# Patient Record
Sex: MALE | Employment: UNEMPLOYED | URBAN - NONMETROPOLITAN AREA
[De-identification: names, ages, dates, MRNs, and addresses within clinical notes are randomized per-mention and may not be internally consistent; named-entity substitution may affect disease eponyms.]

---

## 2018-10-10 ENCOUNTER — TELEMEDICINE2 (OUTPATIENT)
Dept: MEDICAL GROUP | Facility: PHYSICIAN GROUP | Age: 43
End: 2018-10-10

## 2018-10-10 VITALS
SYSTOLIC BLOOD PRESSURE: 166 MMHG | WEIGHT: 133 LBS | DIASTOLIC BLOOD PRESSURE: 94 MMHG | HEIGHT: 67 IN | HEART RATE: 61 BPM | OXYGEN SATURATION: 98 % | BODY MASS INDEX: 20.88 KG/M2

## 2018-10-10 DIAGNOSIS — B20 HIV (HUMAN IMMUNODEFICIENCY VIRUS INFECTION) (HCC): ICD-10-CM

## 2018-10-10 DIAGNOSIS — Z90.5 SINGLE KIDNEY: ICD-10-CM

## 2018-10-10 DIAGNOSIS — Z87.442 HISTORY OF KIDNEY STONES: ICD-10-CM

## 2018-10-10 DIAGNOSIS — B35.4 TINEA CORPORIS: ICD-10-CM

## 2018-10-10 DIAGNOSIS — K42.9 UMBILICAL HERNIA WITHOUT OBSTRUCTION AND WITHOUT GANGRENE: ICD-10-CM

## 2018-10-10 DIAGNOSIS — F33.42 RECURRENT MAJOR DEPRESSIVE DISORDER, IN FULL REMISSION (HCC): ICD-10-CM

## 2018-10-10 PROBLEM — Z21 HIV (HUMAN IMMUNODEFICIENCY VIRUS INFECTION) (HCC): Status: ACTIVE | Noted: 2018-10-10

## 2018-10-10 PROCEDURE — 99203 OFFICE O/P NEW LOW 30 MIN: CPT | Performed by: NURSE PRACTITIONER

## 2018-10-10 RX ORDER — AZITHROMYCIN 600 MG/1
600 TABLET, FILM COATED ORAL DAILY
COMMUNITY
End: 2019-07-01

## 2018-10-10 RX ORDER — TRAZODONE HYDROCHLORIDE 100 MG/1
100 TABLET ORAL NIGHTLY
COMMUNITY
End: 2024-02-09

## 2018-10-10 RX ORDER — SULFAMETHOXAZOLE AND TRIMETHOPRIM 800; 160 MG/1; MG/1
1 TABLET ORAL 2 TIMES DAILY
COMMUNITY
End: 2019-10-21 | Stop reason: SDUPTHER

## 2018-10-10 NOTE — ASSESSMENT & PLAN NOTE
This is a 43 year old male who is new to [facility]. Former [facility] patient. Patient states lowest CD4 was 35. Hospitalized for PCP in the past. Current regiment patient likes.   HIV ROS     Medication:   Current Outpatient Prescriptions:   •  dolutegravir, 50 mg, Oral, DAILY  •  sulfamethoxazole-trimethoprim, 1 Tab, Oral, BID  •  azithromycin, 600 mg, Oral, DAILY  •  traZODone, 100 mg, Oral, Nightly  •  sertraline, 50 mg, Oral, DAILY  •  Emtricitabine-Tenofovir AF, Take  by mouth., Taking    Missed medications: no .    CD4 - 135   Viral Load: 66,600  Kidney Function normal     Weight loss? yes    Night sweats?no .   Body aches ?  no .    Skin ? Red, circular, itchy lesions on foot, back, trunk and scalp   Neuro: No headache, No sensation changes, No dizziness, No confusion.  Cardiac: No cp, No Wade, No peripheral edema. No calf pain at rest.  Pulm: No cough, No sob, No sputum   Gastro: No nausea, No vomiting,No diarrhea, No rectal bleeding, No abdominal pain  : No dysuria. No blisters, No incontinence.   Constitutional : No fevers, No night sweats.  Musculoskeletal: No swelling,redness or pain to joints. Normal ROM and gait  Skin: rashes, lesions, itching  PHQ 2 negative Depression Screening    Little interest or pleasure in doing things?      Feeling down, depressed , or hopeless?     Trouble falling or staying asleep, or sleeping too much?      Feeling tired or having little energy?      Poor appetite or overeating?      Feeling bad about yourself - or that you are a failure or have let yourself or your family down?     Trouble concentrating on things, such as reading the newspaper or watching television?     Moving or speaking so slowly that other people could have noticed.  Or the opposite - being so fidgety or restless that you have been moving around a lot more than usual?      Thoughts that you would be better off dead, or of hurting yourself?      Patient Health Questionnaire Score:         If depressive  symptoms identified deferred to follow up visit unless specifically addressed in assesment and plan.    Interpretation of PHQ-9 Total Score   Score Severity   1-4 No Depression   5-9 Mild Depression   10-14 Moderate Depression   15-19 Moderately Severe Depression   20-27 Severe Depression      Psych: hallucinations ? no .     Auditory hallucinations ? no .   Paranoid  ? no .    DRUG ---DRUG interaction? no .

## 2018-10-10 NOTE — PROGRESS NOTES
Chief Complaint   Patient presents with   • HIV Positive/AIDS           HISTORY OF PRESENT ILLNESS: Patient is a 43 y.o. male NEW patient, who presents today to discuss:     Verified Identification with patient.   RN presenter @  [facility]    HIV (human immunodeficiency virus infection) ([facility])  This is a 43 year old male who is new to [facility]. Former [facility] patient. Patient states lowest CD4 was 35. Hospitalized for PCP in the past. Current regiment patient likes.   HIV ROS     Medication:   Current Outpatient Prescriptions:   •  dolutegravir, 50 mg, Oral, DAILY  •  sulfamethoxazole-trimethoprim, 1 Tab, Oral, BID  •  azithromycin, 600 mg, Oral, DAILY  •  traZODone, 100 mg, Oral, Nightly  •  sertraline, 50 mg, Oral, DAILY  •  Emtricitabine-Tenofovir AF, Take  by mouth., Taking    Missed medications: no .    CD4 - 135   Viral Load: 66,600  Kidney Function normal     Weight loss? yes    Night sweats?no .   Body aches ?  no .    Skin ? Red, circular, itchy lesions on foot, back, trunk and scalp   Neuro: No headache, No sensation changes, No dizziness, No confusion.  Cardiac: No cp, No Wade, No peripheral edema. No calf pain at rest.  Pulm: No cough, No sob, No sputum   Gastro: No nausea, No vomiting,No diarrhea, No rectal bleeding, No abdominal pain  : No dysuria. No blisters, No incontinence.   Constitutional : No fevers, No night sweats.  Musculoskeletal: No swelling,redness or pain to joints. Normal ROM and gait  Skin: rashes, lesions, itching  PHQ 2 negative Depression Screening    Little interest or pleasure in doing things?      Feeling down, depressed , or hopeless?     Trouble falling or staying asleep, or sleeping too much?      Feeling tired or having little energy?      Poor appetite or overeating?      Feeling bad about yourself - or that you are a failure or have let yourself or your family down?     Trouble concentrating on things, such as reading the newspaper or watching television?     Moving or speaking  "so slowly that other people could have noticed.  Or the opposite - being so fidgety or restless that you have been moving around a lot more than usual?      Thoughts that you would be better off dead, or of hurting yourself?      Patient Health Questionnaire Score:         If depressive symptoms identified deferred to follow up visit unless specifically addressed in assesment and plan.    Interpretation of PHQ-9 Total Score   Score Severity   1-4 No Depression   5-9 Mild Depression   10-14 Moderate Depression   15-19 Moderately Severe Depression   20-27 Severe Depression      Psych: hallucinations ? no .     Auditory hallucinations ? no .   Paranoid  ? no .    DRUG ---DRUG interaction? no .      Tinea corporis  Patient has tried OTC TINACTIN. No resolution.     History of kidney stones  Two years ago was last episode. Encourage hydration.     Single kidney  Discussed need to protect kidney.    Recurrent major depressive disorder, in full remission (HCC)  Patient is currently being treated with medication for an emotional disorder. Taking meds every day and denies worsening of sx, SI or HI. Advised on non-pharmaceutical means of controlling and dealing with emotions.    Patient is actively seeing mental health provider. [facility]          Allergies   Allergen Reactions   • Videx                ROS: As documented in my HPI      Exam:  Blood pressure (!) 166/94, pulse 61, height 1.702 m (5' 7\"), weight 60.3 kg (133 lb), SpO2 98 %.  General:  Well nourished, well developed male in NAD  Head: No lesions, Non tender scalp  Neck: Supple. Thyroid is symmetric. No lymphadenopathy   Oral Cavity: no thrush or gum lesions  ABD: hernia umbilical - no incarceration  Pulmonary: .  Normal effort. No rales, ronchi, or wheezing via Telesteth system  Cardiovascular: Regular rate and rhythm without murmur.   Extremities: no clubbing, cyanosis, or edema.  Psych: Alert and oriented x3. Normal mood and affect.  Neurological: No focal " deficits  SKIN scattered red , circular, no pustular itchy lesion on feet, back, trunk Please note that this dictation was created using voice recognition software. I have made every reasonable attempt to correct obvious errors, but I expect that there are errors of grammar and possibly content that I did not discover before finalizing the note.    Assessment/Plan:  1. HIV (human immunodeficiency virus infection) ([facility])   patient CD4 count is 135 this is the first time that it has been over 50 in quite some time.  Patient will continue on Bactrim and erythromycin for prophylaxis for PCP and MAC.  Viral load is elevated will need to continue to monitor this as he has been disrupted in his treatment due to incarceration.  There is also the consideration that he has resistance.  Liver enzymes are slightly elevated as well AST is 134 ALT 49 hepatitis screen is negative RPR is negative I do not have results of chest x-ray.   2. Recurrent major depressive disorder, in full remission ([facility])   Current status of condition is chronic and controlled on therapy.     3. Single kidney   monitor   4. History of kidney stones     5. Tinea corporis  Clortrimazole from [facility], as TINACTIN has failed   6. Umbilical hernia without obstruction and without gangrene   stable      BMI 20 - ADD BOOST bid.

## 2018-10-10 NOTE — ASSESSMENT & PLAN NOTE
Patient is currently being treated with medication for an emotional disorder. Taking meds every day and denies worsening of sx, SI or HI. Advised on non-pharmaceutical means of controlling and dealing with emotions.    Patient is actively seeing mental health provider. [facility]

## 2019-02-20 ENCOUNTER — TELEMEDICINE2 (OUTPATIENT)
Dept: INFECTIOUS DISEASES | Facility: MEDICAL CENTER | Age: 44
End: 2019-02-20
Payer: OTHER GOVERNMENT

## 2019-02-20 VITALS
DIASTOLIC BLOOD PRESSURE: 89 MMHG | BODY MASS INDEX: 20.28 KG/M2 | HEIGHT: 67 IN | RESPIRATION RATE: 18 BRPM | SYSTOLIC BLOOD PRESSURE: 134 MMHG | WEIGHT: 129.2 LBS | HEART RATE: 79 BPM | OXYGEN SATURATION: 96 % | TEMPERATURE: 98.2 F

## 2019-02-20 DIAGNOSIS — F33.42 RECURRENT MAJOR DEPRESSIVE DISORDER, IN FULL REMISSION (HCC): ICD-10-CM

## 2019-02-20 DIAGNOSIS — B20 HIV (HUMAN IMMUNODEFICIENCY VIRUS INFECTION) (HCC): ICD-10-CM

## 2019-02-20 PROBLEM — B35.4 TINEA CORPORIS: Status: RESOLVED | Noted: 2018-10-10 | Resolved: 2019-02-20

## 2019-02-20 PROCEDURE — 99203 OFFICE O/P NEW LOW 30 MIN: CPT | Performed by: INTERNAL MEDICINE

## 2019-02-20 NOTE — PROGRESS NOTES
"[facility]     Date of Service: 2/20/2019    Referring Physician: RAZ    Reason for Referral: HIV    Chief Complaint: Here to establish care for HIV    History of Present Illness:     Derrick Gaytan is a 43 y.o. male with known HIV and depression.  Patient has been taking his current ART, reports not missing doses.  Tolerating his medications, no new issues to report.    Current ART: Descovy + Tivicay  Prior HIV treatment: Multiple, including Truvada, indinavir, didanosine  Resistance: Unknown  Diagnosed with HIV: January 2000  Risk factors: MSM, \"Living on the street\"  HIV CD4 / viral load at start of treatment: Unknown  OIs: \"Double pneumonia\"    Pertinent Lab Results:  Date  CD4/%  HIV VL  ART  6/2018    [id number] Descovy+Tivicay  8/2018  135/10.4% 58158  \"  12/2018 143/7.5% 80  \"  1/28/2019 148/8.2% 140  \"    Screening:   HBV serologies:?  HAV serology:?  HCV Ab:negative  RPR: negative  Quant gold/PPD:?  Urine GC/CT:?  UA:?    LABS  Date  WBC  HGB  PLAT  1/28/2019 3.3  15.9  177    Date  CR   1/28/2019 1.38    Date  PT/INR  TBili AlkPh  AST ALT Album  1/28/2019     0.3   22 13    Lipids   Date  Chol Trig HDL VLDL LDL  ?    HgbA1C  Date  HbA1c  ?    Review of Systems:  All other systems reviewed and are negative expect as noted in HPI    Past Medical History:   Diagnosis Date   • AIDS (acquired immune deficiency syndrome) (HCC)    • Depression    • HIV (human immunodeficiency virus infection)    • kidney problems     have only one kidney    • Single kidney        No past surgical history on file.    Family History   Problem Relation Age of Onset   • Diabetes Mother    • Diabetes Father    • No Known Problems Sister        Social History     Social History   • Marital status: Single     Spouse name: N/A   • Number of children: N/A   • Years of education: N/A     Occupational History   • Not on file.     Social History Main Topics   • Smoking status: Former Smoker   • Smokeless " "tobacco: Never Used      Comment: 1 ppd   • Alcohol use No   • Drug use: Yes     Types: Methamphetamines      Comment: pot   • Sexual activity: Not Currently     Partners: Male     Other Topics Concern   • Not on file     Social History Narrative   • No narrative on file       Allergies   Allergen Reactions   • Didanosine      \"Nearly killed me\". Breathing difficulties       Medications:  Current Outpatient Prescriptions on File Prior to Visit   Medication Sig Dispense Refill   • sulfamethoxazole-trimethoprim (BACTRIM DS) 800-160 MG tablet Take 1 Tab by mouth 2 times a day.     • azithromycin (ZITHROMAX) 600 MG Tab Take 600 mg by mouth every day.     • traZODone (DESYREL) 100 MG Tab Take 100 mg by mouth every evening.     • sertraline (ZOLOFT) 50 MG Tab Take 50 mg by mouth every day.     • dolutegravir (TIVICAY) 50 MG Tab tablet Take 1 Tab by mouth every day. 30 Tab 11   • Emtricitabine-Tenofovir AF (DESCOVY) 200-25 MG Tab Take 1 Tab by mouth every day. 30 Tab 11     No current facility-administered medications on file prior to visit.        Physical Exam:   This consultation was conducted utilizing secure and encrypted videoconferencing equipment with the assistance of a trained tele-presenter at the originating site.     Vital Signs: /89   Pulse 79   Temp 36.8 °C (98.2 °F)   Resp 18   Ht 1.702 m (5' 7\")   Wt 58.6 kg (129 lb 3.2 oz)   SpO2 96%   BMI 20.24 kg/m²   Vital signs reviewed  Constitutional: Patient is oriented to person, place, and time. Appears well-developed and well-nourished. No distress  Head: Atraumatic, normocephalic  Eyes: Conjunctivae normal, EOM intact. Pupils are equal, round, and reactive to light.   Mouth/Throat: Lips without lesions, good dentition, oropharynx is clear and moist.  Neck: Neck supple. No masses/lymphadenopathy  Cardiovascular: Normal rate, regular rhythm, normal S1S2 and intact distal pulses. No murmur, gallop, or friction rub. No pedal edema.  Pulmonary/Chest: No " respiratory distress. Unlabored respiratory effort, lungs clear to auscultation. No wheezes or rales.   Abdominal: Soft, non tender. BS + x 4. No masses or hepatosplenomegaly.   Musculoskeletal: No joint tenderness, swelling, erythema, or restriction of motion noted.  Neurological: Alert and oriented to person, place, and time. No gross cranial nerve deficit. No focal neural deficit noted  Skin: Skin is warm and dry. No rashes or embolic phenomena noted on exposed skin  Psychiatric: Normal mood and affect. Behavior is normal.     LABS:  WBC   Date/Time Value Ref Range Status   05/08/2011 10:40 PM 9.1 4.8 - 10.8 K/uL Final     RBC   Date/Time Value Ref Range Status   05/08/2011 10:40 PM 5.06 4.70 - 6.10 M/uL Final     Hemoglobin   Date/Time Value Ref Range Status   05/08/2011 10:40 PM 16.0 14.0 - 18.0 g/dL Final     Hematocrit   Date/Time Value Ref Range Status   05/08/2011 10:40 PM 44.8 42.0 - 52.0 % Final     MCV   Date/Time Value Ref Range Status   05/08/2011 10:40 PM 88.5 79.0 - 98.0 fL Final     MCH   Date/Time Value Ref Range Status   05/08/2011 10:40 PM 31.6 27.0 - 33.0 pg Final     MCHC   Date/Time Value Ref Range Status   05/08/2011 10:40 PM 35.7 (H) 30.0 - 35.0 g/dL Final     MPV   Date/Time Value Ref Range Status   05/08/2011 10:40 PM 6.9 6.7 - 10.4 fL Final     Sodium   Date/Time Value Ref Range Status   05/08/2011 10:40  (L) 135 - 145 mmol/L Final     Potassium   Date/Time Value Ref Range Status   05/08/2011 10:40 PM 4.0 3.6 - 5.5 mmol/L Final     Chloride   Date/Time Value Ref Range Status   05/08/2011 10:40  96 - 112 mmol/L Final     Co2   Date/Time Value Ref Range Status   05/08/2011 10:40 PM 22 20 - 33 mmol/L Final     Glucose   Date/Time Value Ref Range Status   05/08/2011 10:40 PM 86 65 - 99 mg/dL Final     Bun   Date/Time Value Ref Range Status   05/08/2011 10:40 PM 20 8 - 22 mg/dL Final     Creatinine   Date/Time Value Ref Range Status   05/08/2011 10:40 PM 1.18 0.50 - 1.40 mg/dL  Final   01/23/2009 01:10 PM 1.3 0.5 - 1.4 mg/dL Final     Alkaline Phosphatase   Date/Time Value Ref Range Status   05/08/2011 10:40 PM 92 30 - 99 U/L Final     AST(SGOT)   Date/Time Value Ref Range Status   05/08/2011 10:40 PM 34 12 - 45 U/L Final     ALT(SGPT)   Date/Time Value Ref Range Status   05/08/2011 10:40 PM 23 2 - 50 U/L Final     Total Bilirubin   Date/Time Value Ref Range Status   05/08/2011 10:40 PM 1.4 0.1 - 1.5 mg/dL Final      No results found for: CPKTOTAL     MICRO:  Blood Culture   Date Value Ref Range Status   01/23/2009   Final    Blood culture testing and Gram stain, if indicated, are  performed at [facility], [address] [place] [address].  Positive blood cultures are  sent to [facility], [address] [place] [address], for organism identification and  susceptibility testing.  No growth after 5 days of incubation.         Latest pertinent labs were reviewed    Assessment:   Derrick Gaytan is a 43 y.o. male with a history of HIV and depression.  Has current low level viremia and low CD4 count.  He was previously under the care of [facility].    Pertinent Diagnoses:  HIV  Depression    Plan:   Medications  -Continue Descovy 1 tab daily + Tivicay 1 tab daily  -Continue Bactrim prophylaxis 1DS tab daily  -Discontinue azithromycin since he has no indication for MAC prophylaxis  Labs  -Please check hepatitis B surface antigen, hepatitis B surface antibody, hepatitis B core antibody  -Please check hepatitis C antibody  -Please check urine GC/CT NAAT  -Please check serum Treponema IgG or serum RPR  -Please check hemoglobin A1c if not checked within the past year  -Please check TB QuantiFERON gold or PPD skin test if not done within the past year  -Check urinalysis to look for proteinuria  -Check lipid panel if not obtained within the past year  Vaccines  -If not received pneumococcal vaccination, recommend PCV 13, followed by PPSV 23 greater  than or equal to 8 weeks later  Records  -Please obtain records from [facility], especially prior ART regimens and HIV genotypic resistance testing if available    Follow-up in 3 months with repeat CD4 count and viral load    Dav Echols M.D.    Please note that this dictation was created using voice recognition software. I have worked with technical experts from [facility] to optimize the interface.  I have made every reasonable attempt to correct obvious errors, but there may be errors of grammar and possibly content that I did not discover before finalizing the note.

## 2019-06-30 NOTE — PROGRESS NOTES
"[place] - [facility] HIV TELECONFERENCE CLINIC NOTE -follow-up     Date of Service: 6/29/2019    Referring Physician: RAZ    Chief Complaint: Follow-up for HIV    History of Present Illness:     Derrick Gaytan is a 44 y.o. male with known HIV and depression.  Patient has been taking his current ART, reports not missing doses.  Tolerating his medications, no new issues to report.     Current ART: Descovy + Tivicay  Prior HIV treatment: Multiple, including Truvada, indinavir, didanosine  Resistance: Unknown  Diagnosed with HIV: January 2000  Risk factors: MSM, \"Living on the street\"  HIV CD4 / viral load at start of treatment: Unknown  OIs: \"Double pneumonia\"     Pertinent Lab Results:  Date                 CD4/%             HIV VL             ART  6/2018                                     [id number]          Descovy+Tivicay  8/2018             135/10.4%       [id number]              \"  12/2018           143/7.5%         80                    \"  1/28/2019        148/8.2%         140                  \"  6/15/2019 239/10.4% 50  \"     Screening:   HBV serologies:?  HAV serology:?  HCV Ab:negative  RPR: negative  Quant gold/PPD:?  Urine GC/CT:?  UA:?     LABS  Date                 WBC                HGB                PLAT  1/28/2019        3.3                   15.9                 177  6/15/2019 4.4  15.4  197      Date                 CR         1/28/2019        1.38  6/15/2019 1.48, EGFR 56     Date                 PT/INR  TBili   AlkPh               AST     ALT      Album  1/28/2019                      0.3                             22        13  6/15/2019  0.4   29 32      Lipids   Date                 Chol     Trig      HDL     VLDL   LDL  ?     HgbA1C  Date                 HbA1c  ?      Review of Systems:  All other systems reviewed and are negative expect as noted in HPI    Past Medical History:   Diagnosis Date   • AIDS (acquired immune deficiency syndrome) (HCC)    • Depression    • HIV (human immunodeficiency virus " "infection)    • kidney problems     have only one kidney    • Single kidney        No past surgical history on file.    Family History   Problem Relation Age of Onset   • Diabetes Mother    • Diabetes Father    • No Known Problems Sister        Social History     Social History   • Marital status: Single     Spouse name: N/A   • Number of children: N/A   • Years of education: N/A     Occupational History   • Not on file.     Social History Main Topics   • Smoking status: Former Smoker   • Smokeless tobacco: Never Used      Comment: 1 ppd   • Alcohol use No   • Drug use: Yes     Types: Methamphetamines      Comment: pot   • Sexual activity: Not Currently     Partners: Male     Other Topics Concern   • Not on file     Social History Narrative   • No narrative on file       Allergies   Allergen Reactions   • Didanosine      \"Nearly killed me\". Breathing difficulties       Medications:  Current Outpatient Prescriptions on File Prior to Visit   Medication Sig Dispense Refill   • sulfamethoxazole-trimethoprim (BACTRIM DS) 800-160 MG tablet Take 1 Tab by mouth 2 times a day.     • azithromycin (ZITHROMAX) 600 MG Tab Take 600 mg by mouth every day.     • traZODone (DESYREL) 100 MG Tab Take 100 mg by mouth every evening.     • sertraline (ZOLOFT) 50 MG Tab Take 50 mg by mouth every day.     • dolutegravir (TIVICAY) 50 MG Tab tablet Take 1 Tab by mouth every day. 30 Tab 11   • Emtricitabine-Tenofovir AF (DESCOVY) 200-25 MG Tab Take 1 Tab by mouth every day. 30 Tab 11     No current facility-administered medications on file prior to visit.    Lisinopril  Lovastatin    Physical Exam:   This consultation was conducted utilizing secure and encrypted videoconferencing equipment with the assistance of a trained tele-presenter at the originating site.     Vital Signs: T 98 HR 70 RR 20 /80 O2 96% weight 136 pounds height 5 feet 7 inches  Vital signs reviewed  Constitutional: Patient is oriented to person, place, and time. " Appears well-developed and well-nourished. No distress  Head: Atraumatic, normocephalic  Eyes: Conjunctivae normal, EOM intact. Pupils are equal, round, and reactive to light.   Mouth/Throat: Lips without lesions, good dentition, oropharynx is clear and moist.  Neck: Neck supple. No masses/lymphadenopathy  Cardiovascular: Normal rate, regular rhythm, normal S1S2 and intact distal pulses. No murmur, gallop, or friction rub. No pedal edema.  Pulmonary/Chest: No respiratory distress. Unlabored respiratory effort, lungs clear to auscultation. No wheezes or rales.   Abdominal: Soft, non tender. BS + x 4. No masses or hepatosplenomegaly.   Musculoskeletal: No joint tenderness, swelling, erythema, or restriction of motion noted.  Neurological: Alert and oriented to person, place, and time. No gross cranial nerve deficit.   Skin: Skin is warm and dry. No rashes or embolic phenomena noted on exposed skin  Psychiatric: Normal mood and affect. Behavior is normal.     LABS:  WBC   Date/Time Value Ref Range Status   05/08/2011 10:40 PM 9.1 4.8 - 10.8 K/uL Final     RBC   Date/Time Value Ref Range Status   05/08/2011 10:40 PM 5.06 4.70 - 6.10 M/uL Final     Hemoglobin   Date/Time Value Ref Range Status   05/08/2011 10:40 PM 16.0 14.0 - 18.0 g/dL Final     Hematocrit   Date/Time Value Ref Range Status   05/08/2011 10:40 PM 44.8 42.0 - 52.0 % Final     MCV   Date/Time Value Ref Range Status   05/08/2011 10:40 PM 88.5 79.0 - 98.0 fL Final     MCH   Date/Time Value Ref Range Status   05/08/2011 10:40 PM 31.6 27.0 - 33.0 pg Final     MCHC   Date/Time Value Ref Range Status   05/08/2011 10:40 PM 35.7 (H) 30.0 - 35.0 g/dL Final     MPV   Date/Time Value Ref Range Status   05/08/2011 10:40 PM 6.9 6.7 - 10.4 fL Final     Sodium   Date/Time Value Ref Range Status   05/08/2011 10:40  (L) 135 - 145 mmol/L Final     Potassium   Date/Time Value Ref Range Status   05/08/2011 10:40 PM 4.0 3.6 - 5.5 mmol/L Final     Chloride   Date/Time  Value Ref Range Status   05/08/2011 10:40  96 - 112 mmol/L Final     Co2   Date/Time Value Ref Range Status   05/08/2011 10:40 PM 22 20 - 33 mmol/L Final     Glucose   Date/Time Value Ref Range Status   05/08/2011 10:40 PM 86 65 - 99 mg/dL Final     Bun   Date/Time Value Ref Range Status   05/08/2011 10:40 PM 20 8 - 22 mg/dL Final     Creatinine   Date/Time Value Ref Range Status   05/08/2011 10:40 PM 1.18 0.50 - 1.40 mg/dL Final   01/23/2009 01:10 PM 1.3 0.5 - 1.4 mg/dL Final     Alkaline Phosphatase   Date/Time Value Ref Range Status   05/08/2011 10:40 PM 92 30 - 99 U/L Final     AST(SGOT)   Date/Time Value Ref Range Status   05/08/2011 10:40 PM 34 12 - 45 U/L Final     ALT(SGPT)   Date/Time Value Ref Range Status   05/08/2011 10:40 PM 23 2 - 50 U/L Final     Total Bilirubin   Date/Time Value Ref Range Status   05/08/2011 10:40 PM 1.4 0.1 - 1.5 mg/dL Final      No results found for: CPKTOTAL     MICRO:  Blood Culture   Date Value Ref Range Status   01/23/2009   Final    Blood culture testing and Gram stain, if indicated, are  performed at [facility], [address] [place] [address].  Positive blood cultures are  sent to [facility], [address] [place] [address], for organism identification and  susceptibility testing.  No growth after 5 days of incubation.         Latest pertinent labs were reviewed      Assessment:   Derrick Gaytan is a 43 y.o. male with a history of HIV and depression.  Has current low level viremia and low CD4 count.  He was previously under the care of [facility].     Pertinent Diagnoses:  HIV  Depression    Plan:   Medications  -Continue Descovy 1 tab daily + Tivicay 1 tab daily  -Continue Bactrim prophylaxis 1DS tab daily    Labs as requested at previous visit (please fax and scan if already performed)  -Please check hepatitis B surface antigen, hepatitis B surface antibody, hepatitis B core antibody  -Please check hepatitis C  antibody  -Please check urine GC/CT NAAT  -Please check RPR  -Please check hemoglobin A1c if not checked within the past year  -Please check TB QuantiFERON gold or PPD skin test if not done within the past year  -Check urinalysis to look for proteinuria  -Check lipid panel if not obtained within the past year     Vaccines  -Received PPSV23 on 5/30/2019.  Recommend PCV 13 a year later    Records  -Please obtain records from [facility], especially prior ART regimens and HIV genotypic resistance testing if available     Follow-up in 3 months with repeat CD4 count and viral load, CBC and CMP     Dav Echols M.D.    Please note that this dictation was created using voice recognition software. I have worked with technical experts from [facility] to optimize the interface.  I have made every reasonable attempt to correct obvious errors, but there may be errors of grammar and possibly content that I did not discover before finalizing the note.

## 2019-07-01 ENCOUNTER — TELEMEDICINE2 (OUTPATIENT)
Dept: VASCULAR LAB | Facility: MEDICAL CENTER | Age: 44
End: 2019-07-01
Attending: INTERNAL MEDICINE
Payer: OTHER GOVERNMENT

## 2019-07-01 DIAGNOSIS — B20 HIV (HUMAN IMMUNODEFICIENCY VIRUS INFECTION) (HCC): ICD-10-CM

## 2019-07-01 PROCEDURE — 99213 OFFICE O/P EST LOW 20 MIN: CPT | Performed by: INTERNAL MEDICINE

## 2019-10-20 NOTE — PROGRESS NOTES
"[facility] HIV TELECONFERENCE CLINIC NOTE     Date of Service: 10/20/2019    Referring Physician: RAZ    Chief Complaint: Follow-up for HIV    History of Present Illness:        Derrick Gaytan is a 44 y.o. male with known HIV and depression.  Patient has been taking his current ART, reports not missing doses.  Tolerating his medications.    Patient is requesting a single pill regimen.    Patient also notes a weight of 131 pounds.  In June, he was 140 pounds, then measured at 136 pounds in July 2019.  He is requesting Ensure.  Patient has a significant smoking history about a pack a day as he was 9 years old.  His grandmother had lung cancer.  He has no cough or hemoptysis.     Current ART: Descovy + Tivicay  Prior HIV treatment: Multiple, including Truvada, indinavir, didanosine  Resistance: Unknown  Diagnosed with HIV: January 2000  Risk factors: MSM, \"Living on the street\"  HIV CD4 / viral load at start of treatment: Unknown  OIs: \"Double pneumonia\"     Pertinent Lab Results:  Date                 CD4/%             HIV VL             ART  6/2018                                     [id number]          Descovy+Tivicay  8/2018             135/10.4%       [id number]              \"  12/2018           143/7.5%         80                    \"  1/28/2019        148/8.2%         140                  \"  6/15/2019        239/10.4%       50                    \"  10/12/2019 218/10.9% 40  \"     Screening:   HBV serologies: Nonimmune  HAV serology: Nonimmune  HCV Ab:negative 7/2019  RPR: negative 7/2019  PPD: -3/2019  Urine GC/CT: -9/2019  UA: No proteinuria 9/2019     LABS  Date                 WBC                HGB                PLAT  1/28/2019        3.3                   15.9                 177  6/15/2019        4.4                   15.4                 197        10/12/2019 3.5  16.4  191     Date                 CR         1/28/2019        1.38  6/15/2019        1.48, EGFR 56  10/12/2019 1.36, EGFR " 62     Date                 PT/INR  TBili   AlkPh               AST     ALT      Album  1/28/2019                      0.3                             22        13  6/15/2019                    0.4                               29        32  10/12/2019  0.4 86  25 19                 Lipids   Date                 Chol     Trig      HDL     VLDL   LDL  7/13/2019 155 191 28 30 89     HgbA1C  Date                 HbA1c  7/13/2019 5.5%    Review of Systems:  All other systems reviewed and are negative expect as noted in HPI    Past Medical History:   Diagnosis Date   • AIDS (acquired immune deficiency syndrome) (HCC)    • Depression    • HIV (human immunodeficiency virus infection)    • kidney problems     have only one kidney    • Single kidney        No past surgical history on file.    Family History   Problem Relation Age of Onset   • Diabetes Mother    • Diabetes Father    • No Known Problems Sister        Social History     Socioeconomic History   • Marital status: Single     Spouse name: Not on file   • Number of children: Not on file   • Years of education: Not on file   • Highest education level: Not on file   Occupational History   • Not on file   Social Needs   • Financial resource strain: Not on file   • Food insecurity:     Worry: Not on file     Inability: Not on file   • Transportation needs:     Medical: Not on file     Non-medical: Not on file   Tobacco Use   • Smoking status: Former Smoker   • Smokeless tobacco: Never Used   • Tobacco comment: 1 ppd   Substance and Sexual Activity   • Alcohol use: No   • Drug use: Yes     Types: Methamphetamines     Comment: pot   • Sexual activity: Not Currently     Partners: Male   Lifestyle   • Physical activity:     Days per week: Not on file     Minutes per session: Not on file   • Stress: Not on file   Relationships   • Social connections:     Talks on phone: Not on file     Gets together: Not on file     Attends Jew service: Not on file     Active member of  "club or organization: Not on file     Attends meetings of clubs or organizations: Not on file     Relationship status: Not on file   • Intimate partner violence:     Fear of current or ex partner: Not on file     Emotionally abused: Not on file     Physically abused: Not on file     Forced sexual activity: Not on file   Other Topics Concern   • Not on file   Social History Narrative   • Not on file       Allergies   Allergen Reactions   • Didanosine      \"Nearly killed me\". Breathing difficulties       Medications:  Trazodone  Zoloft  Lovastatin  Norvasc  Flomax  Descovy  Tivicay    Physical Exam:   This consultation was conducted utilizing secure and encrypted videoconferencing equipment with the assistance of a trained tele-presenter at the originating site.     Vital Signs: 97.5 HR 98 RR 18 /97 o2 98 Wt 131.8 Ht 5' 7\"  Vital signs reviewed  Constitutional: Patient is oriented to person, place, and time. Appears well-developed and well-nourished. No distress  Head: Atraumatic, normocephalic  Eyes: Conjunctivae normal, EOM intact. Pupils are equal, round, and reactive to light.   Mouth/Throat: Lips without lesions, dentures, oropharynx is clear and moist.  Neck: Neck supple. No masses/lymphadenopathy  Cardiovascular: Normal rate, regular rhythm, normal S1S2 and intact distal pulses. No murmur, gallop, or friction rub. No pedal edema.  Pulmonary/Chest: No respiratory distress. Unlabored respiratory effort, lungs clear to auscultation. No wheezes or rales. No axillary LNpathy  Abdominal: Soft, non tender. BS + x 4. No masses or hepatosplenomegaly.   Musculoskeletal: No joint tenderness, swelling, erythema, or restriction of motion noted.  Neurological: Alert and oriented to person, place, and time. No gross cranial nerve deficit.   Skin: Skin is warm and dry. No rashes or embolic phenomena noted on exposed skin  Psychiatric: Normal mood and affect. Behavior is normal.     LABS:  WBC   Date/Time Value Ref Range " Status   05/08/2011 10:40 PM 9.1 4.8 - 10.8 K/uL Final     RBC   Date/Time Value Ref Range Status   05/08/2011 10:40 PM 5.06 4.70 - 6.10 M/uL Final     Hemoglobin   Date/Time Value Ref Range Status   05/08/2011 10:40 PM 16.0 14.0 - 18.0 g/dL Final     Hematocrit   Date/Time Value Ref Range Status   05/08/2011 10:40 PM 44.8 42.0 - 52.0 % Final     MCV   Date/Time Value Ref Range Status   05/08/2011 10:40 PM 88.5 79.0 - 98.0 fL Final     MCH   Date/Time Value Ref Range Status   05/08/2011 10:40 PM 31.6 27.0 - 33.0 pg Final     MCHC   Date/Time Value Ref Range Status   05/08/2011 10:40 PM 35.7 (H) 30.0 - 35.0 g/dL Final     MPV   Date/Time Value Ref Range Status   05/08/2011 10:40 PM 6.9 6.7 - 10.4 fL Final     Sodium   Date/Time Value Ref Range Status   05/08/2011 10:40  (L) 135 - 145 mmol/L Final     Potassium   Date/Time Value Ref Range Status   05/08/2011 10:40 PM 4.0 3.6 - 5.5 mmol/L Final     Chloride   Date/Time Value Ref Range Status   05/08/2011 10:40  96 - 112 mmol/L Final     Co2   Date/Time Value Ref Range Status   05/08/2011 10:40 PM 22 20 - 33 mmol/L Final     Glucose   Date/Time Value Ref Range Status   05/08/2011 10:40 PM 86 65 - 99 mg/dL Final     Bun   Date/Time Value Ref Range Status   05/08/2011 10:40 PM 20 8 - 22 mg/dL Final     Creatinine   Date/Time Value Ref Range Status   05/08/2011 10:40 PM 1.18 0.50 - 1.40 mg/dL Final   01/23/2009 01:10 PM 1.3 0.5 - 1.4 mg/dL Final     Alkaline Phosphatase   Date/Time Value Ref Range Status   05/08/2011 10:40 PM 92 30 - 99 U/L Final     AST(SGOT)   Date/Time Value Ref Range Status   05/08/2011 10:40 PM 34 12 - 45 U/L Final     ALT(SGPT)   Date/Time Value Ref Range Status   05/08/2011 10:40 PM 23 2 - 50 U/L Final     Total Bilirubin   Date/Time Value Ref Range Status   05/08/2011 10:40 PM 1.4 0.1 - 1.5 mg/dL Final      No results found for: CPKTOTAL     MICRO:  Blood Culture   Date Value Ref Range Status   01/23/2009   Final    Blood culture  testing and Gram stain, if indicated, are  performed at [facility], [address] [place] [address].  Positive blood cultures are  sent to [facility], [address] [address], for organism identification and  susceptibility testing.  No growth after 5 days of incubation.      Latest pertinent labs were reviewed    Assessment:   Derrick Gaytan is a 43 y.o. male with a history of HIV and depression.  Has current low level viremia and low CD4 count.  He was previously under the care of [facility]. Pt is requesting a change in ART to a 1 pill regimen, he is also concerned about weight loss.     Pertinent Diagnoses:  HIV, CD4% <14%  Weight loss  CKD II-III  Depression     Plan:   Medications  -Will switch to p.o. Biktarvy 1 tab daily on patient request  -Continue Bactrim prophylaxis 1DS tab daily  -No significant interactions noted     Vaccines  -Recommend hepatitis A and hepatitis B vaccination  -Received PPSV23 on 5/30/2019.  Recommend PCV 13 when available     Weight loss  -Unclear if definite downward trend since he has had lower readings, but at least the last 3 measurements appear to indicate a trend of weight loss  -Likely driven by HIV, but he is now on an integrase inhibitor and improved.  Patient also has a significant smoking history and family history of lung cancer  -Recommend chest x-ray, TSH    CKD II-III  -Monitor, renally dose medication    Records  -Please obtain records from [facility], especially prior ART regimens and HIV genotypic resistance testing if available     Follow-up in 3 months with repeat CD4 count and viral load, CBC and CMP     Dav Echols M.D.    Please note that this dictation was created using voice recognition software. I have worked with technical experts from [facility] to optimize the interface.  I have made every reasonable attempt to correct obvious errors, but there may be errors of grammar and possibly  content that I did not discover before finalizing the note.

## 2019-10-21 ENCOUNTER — TELEMEDICINE2 (OUTPATIENT)
Dept: VASCULAR LAB | Facility: MEDICAL CENTER | Age: 44
End: 2019-10-21
Attending: INTERNAL MEDICINE
Payer: OTHER GOVERNMENT

## 2019-10-21 DIAGNOSIS — N18.2 CKD (CHRONIC KIDNEY DISEASE) STAGE 2, GFR 60-89 ML/MIN: ICD-10-CM

## 2019-10-21 DIAGNOSIS — B20 AIDS (ACQUIRED IMMUNODEFICIENCY SYNDROME), CD4 <=200/<=14% (HCC): ICD-10-CM

## 2019-10-21 DIAGNOSIS — Z90.5 SINGLE KIDNEY: ICD-10-CM

## 2019-10-21 DIAGNOSIS — F33.42 RECURRENT MAJOR DEPRESSIVE DISORDER, IN FULL REMISSION (HCC): ICD-10-CM

## 2019-10-21 PROCEDURE — 99214 OFFICE O/P EST MOD 30 MIN: CPT | Performed by: INTERNAL MEDICINE

## 2019-10-21 RX ORDER — SULFAMETHOXAZOLE AND TRIMETHOPRIM 800; 160 MG/1; MG/1
1 TABLET ORAL DAILY
Qty: 30 TAB | Refills: 11 | Status: SHIPPED | OUTPATIENT
Start: 2019-10-21 | End: 2020-03-09 | Stop reason: SDUPTHER

## 2020-03-09 ENCOUNTER — TELEMEDICINE2 (OUTPATIENT)
Dept: VASCULAR LAB | Facility: MEDICAL CENTER | Age: 45
End: 2020-03-09
Attending: INTERNAL MEDICINE
Payer: OTHER GOVERNMENT

## 2020-03-09 DIAGNOSIS — B20 AIDS (ACQUIRED IMMUNODEFICIENCY SYNDROME), CD4 <=200/<=14% (HCC): ICD-10-CM

## 2020-03-09 DIAGNOSIS — F33.42 RECURRENT MAJOR DEPRESSIVE DISORDER, IN FULL REMISSION (HCC): ICD-10-CM

## 2020-03-09 DIAGNOSIS — N18.2 CKD (CHRONIC KIDNEY DISEASE) STAGE 2, GFR 60-89 ML/MIN: ICD-10-CM

## 2020-03-09 PROCEDURE — 99214 OFFICE O/P EST MOD 30 MIN: CPT | Performed by: INTERNAL MEDICINE

## 2020-03-09 RX ORDER — SULFAMETHOXAZOLE AND TRIMETHOPRIM 800; 160 MG/1; MG/1
1 TABLET ORAL DAILY
Qty: 30 TAB | Refills: 11 | Status: SHIPPED | OUTPATIENT
Start: 2020-03-09 | End: 2020-06-18 | Stop reason: SDUPTHER

## 2020-03-09 RX ORDER — BICTEGRAVIR SODIUM, EMTRICITABINE, AND TENOFOVIR ALAFENAMIDE FUMARATE 50; 200; 25 MG/1; MG/1; MG/1
1 TABLET ORAL DAILY
Qty: 30 TAB | Refills: 11 | Status: SHIPPED | OUTPATIENT
Start: 2020-03-09 | End: 2020-06-18 | Stop reason: SDUPTHER

## 2020-03-09 NOTE — PROGRESS NOTES
"[facility] INFECTIOUS DISEASES CLINIC FOLLOW-UP NOTE     Date of Service: 3/9/2020    Chief Complaint: Follow-up for HIV    History of Present Illness:     Derrick Gaytan is a 44 y.o. male with known HIV and depression.  Patient has been taking his current ART, reports not missing doses.  Tolerating his medications.     Patient was seen in October 2019 and per his request for single pill regimen, was switched from Descovy and Tivicay to Biktarvy.     Also at the time, patient noted a weight of 131 pounds.  In June, he was 140 pounds, then measured at 136 pounds in July 2019.  He is currently on Ensure.  Patient has a significant smoking history about a pack a day as he was 9 years old.  His grandmother had lung cancer.  He has no cough or hemoptysis.  Chest x-ray was obtained in October 2019 with no acute concerning findings.    Patient reports compliance with Biktarvy, is happy with the switch.  His weight has continued to increase now, now up to 135 pounds.  No new or discontinued medications on his list.     Current ART: Biktarvy  Prior HIV treatment: Multiple, including Truvada, indinavir, didanosine, Descovy + Tivicay  Resistance: Unknown  Diagnosed with HIV: January 2000  Risk factors: MSM, \"Living on the street\"  HIV CD4 / viral load at start of treatment: Unknown  OIs: \"Double pneumonia\"     Pertinent Lab Results:  Date                 CD4/%             HIV VL             ART  6/2018                                     [id number]          Descovy+Tivicay  8/2018             135/10.4%       90576              \"  12/2018           143/7.5%         80                    \"  1/28/2019        148/8.2%         140                  \"  6/15/2019        239/10.4%       50                    \"  10/12/2019      218/10.9%       40                    \"  12/14/2019 275/12.5% 50  Biktarvy    Screening:   HBV serologies: Nonimmune  HAV serology: Nonimmune  HCV Ab:negative 7/2019  RPR: negative 7/2019  PPD: -3/2019  Urine GC/CT: " -9/2019  UA: No proteinuria 9/2019     LABS  Date                 WBC                HGB                PLAT  1/28/2019        3.3                   15.9                 177  6/15/2019        4.4                   15.4                 197        10/12/2019      3.5                   16.4                 191  12/13/2019 3.9  16.2  179     Date                 CR         1/28/2019        1.38  6/15/2019        1.48, EGFR 56  10/12/2019      1.36, EGFR 62  12/13/2019 1.37, EGFR 61     Date                 PT/INR  TBili   AlkPh               AST     ALT      Album  1/28/2019                      0.3                             22        13  6/15/2019                    0.4                               29        32  10/12/2019                  0.4       86                    25        19  12/13/2019  0.4 90  28 22 4.9                 Lipids   Date                 Chol     Trig      HDL     VLDL   LDL  7/13/2019        155      191      28        30        89  12/13/2019 158 179 35 36 87     HgbA1C  Date                 HbA1c  7/13/2019        5.5%     Review of Systems:  All other systems reviewed and are negative expect as noted in HPI    Past Medical History:   Diagnosis Date   • AIDS (acquired immune deficiency syndrome) (HCC)    • Depression    • HIV (human immunodeficiency virus infection)    • kidney problems     have only one kidney    • Single kidney        No past surgical history on file.    Family History   Problem Relation Age of Onset   • Diabetes Mother    • Diabetes Father    • No Known Problems Sister        Social History     Socioeconomic History   • Marital status: Single     Spouse name: Not on file   • Number of children: Not on file   • Years of education: Not on file   • Highest education level: Not on file   Occupational History   • Not on file   Social Needs   • Financial resource strain: Not on file   • Food insecurity     Worry: Not on file     Inability: Not on file   • Transportation needs  "    Medical: Not on file     Non-medical: Not on file   Tobacco Use   • Smoking status: Former Smoker   • Smokeless tobacco: Never Used   • Tobacco comment: 1 ppd   Substance and Sexual Activity   • Alcohol use: No   • Drug use: Yes     Types: Methamphetamines     Comment: pot   • Sexual activity: Not Currently     Partners: Male   Lifestyle   • Physical activity     Days per week: Not on file     Minutes per session: Not on file   • Stress: Not on file   Relationships   • Social connections     Talks on phone: Not on file     Gets together: Not on file     Attends Buddhism service: Not on file     Active member of club or organization: Not on file     Attends meetings of clubs or organizations: Not on file     Relationship status: Not on file   • Intimate partner violence     Fear of current or ex partner: Not on file     Emotionally abused: Not on file     Physically abused: Not on file     Forced sexual activity: Not on file   Other Topics Concern   • Not on file   Social History Narrative   • Not on file       Allergies   Allergen Reactions   • Didanosine      \"Nearly killed me\". Breathing difficulties       Medications:  Current Outpatient Medications on File Prior to Visit   Medication Sig Dispense Refill   • sulfamethoxazole-trimethoprim (BACTRIM DS) 800-160 MG tablet Take 1 Tab by mouth every day. 30 Tab 11   • Bictegravir-Emtricitab-Tenofov (BIKTARVY) -25 MG Tab Take 1 Tab by mouth every day. 30 Tab 11   • traZODone (DESYREL) 100 MG Tab Take 100 mg by mouth every evening.     • sertraline (ZOLOFT) 50 MG Tab Take 50 mg by mouth every day.       No current facility-administered medications on file prior to visit.        Physical Exam:   Vital Signs: T 97.8 HR 70 RR 18 /98 o2 98% Wt 135.4 Ht 5' 8\"  Vital signs reviewed  Constitutional: Patient is oriented to person, place, and time. Appears well-developed and well-nourished. No distress  Head: Atraumatic, normocephalic  Eyes: Conjunctivae normal, " EOM intact.   Mouth/Throat: Lips without lesions, oropharynx is clear and moist.  Neck: Neck supple. No masses/lymphadenopathy  Cardiovascular: Normal rate, regular rhythm, normal S1S2 and intact distal pulses. No murmur, gallop, or friction rub. No pedal edema.  Pulmonary/Chest: No respiratory distress. Unlabored respiratory effort, lungs clear to auscultation. No wheezes or rales.   Abdominal: Soft, non tender. BS + x 4. No masses or hepatosplenomegaly.   Musculoskeletal: No joint tenderness, swelling, erythema, or restriction of motion noted.  Neurological: Alert and oriented to person, place, and time. No gross cranial nerve deficit.   Skin: Skin is warm and dry. No rashes or embolic phenomena noted on exposed skin  Psychiatric: Pleasant. Normal mood and affect. Behavior is normal.     LABS:  WBC   Date/Time Value Ref Range Status   05/08/2011 10:40 PM 9.1 4.8 - 10.8 K/uL Final     RBC   Date/Time Value Ref Range Status   05/08/2011 10:40 PM 5.06 4.70 - 6.10 M/uL Final     Hemoglobin   Date/Time Value Ref Range Status   05/08/2011 10:40 PM 16.0 14.0 - 18.0 g/dL Final     Hematocrit   Date/Time Value Ref Range Status   05/08/2011 10:40 PM 44.8 42.0 - 52.0 % Final     MCV   Date/Time Value Ref Range Status   05/08/2011 10:40 PM 88.5 79.0 - 98.0 fL Final     MCH   Date/Time Value Ref Range Status   05/08/2011 10:40 PM 31.6 27.0 - 33.0 pg Final     MCHC   Date/Time Value Ref Range Status   05/08/2011 10:40 PM 35.7 (H) 30.0 - 35.0 g/dL Final     MPV   Date/Time Value Ref Range Status   05/08/2011 10:40 PM 6.9 6.7 - 10.4 fL Final     Sodium   Date/Time Value Ref Range Status   05/08/2011 10:40  (L) 135 - 145 mmol/L Final     Potassium   Date/Time Value Ref Range Status   05/08/2011 10:40 PM 4.0 3.6 - 5.5 mmol/L Final     Chloride   Date/Time Value Ref Range Status   05/08/2011 10:40  96 - 112 mmol/L Final     Co2   Date/Time Value Ref Range Status   05/08/2011 10:40 PM 22 20 - 33 mmol/L Final     Glucose    Date/Time Value Ref Range Status   05/08/2011 10:40 PM 86 65 - 99 mg/dL Final     Bun   Date/Time Value Ref Range Status   05/08/2011 10:40 PM 20 8 - 22 mg/dL Final     Creatinine   Date/Time Value Ref Range Status   05/08/2011 10:40 PM 1.18 0.50 - 1.40 mg/dL Final   01/23/2009 01:10 PM 1.3 0.5 - 1.4 mg/dL Final     Alkaline Phosphatase   Date/Time Value Ref Range Status   05/08/2011 10:40 PM 92 30 - 99 U/L Final     AST(SGOT)   Date/Time Value Ref Range Status   05/08/2011 10:40 PM 34 12 - 45 U/L Final     ALT(SGPT)   Date/Time Value Ref Range Status   05/08/2011 10:40 PM 23 2 - 50 U/L Final     Total Bilirubin   Date/Time Value Ref Range Status   05/08/2011 10:40 PM 1.4 0.1 - 1.5 mg/dL Final      No results found for: CPKTOTAL     MICRO:  Blood Culture   Date Value Ref Range Status   01/23/2009   Final    Blood culture testing and Gram stain, if indicated, are  performed at [facility], [address] [place] [address].  Positive blood cultures are  sent to [facility], [address] [place] [address], for organism identification and  susceptibility testing.  No growth after 5 days of incubation.         Latest pertinent labs were reviewed    Assessment:   Derrick Gaytan is a 43 y.o. male with a history of HIV and depression.  Has current low level viremia and low CD4 count.  He was previously under the care of [facility].  CD4 count appears to be steadily picking up     Pertinent Diagnoses:  HIV, CD4% <14%  Weight loss, improving  CKD II-III  Depression     Plan:   Medications  -Continue p.o. Biktarvy 1 tab daily   -Continue Bactrim prophylaxis 1DS tab daily.  May be able to discontinue this soon if the CD4 count continues to rise  -Advised to take the Ensure 4 hours apart from the Biktarvy     Vaccines  -Received first dose of hepatitis A and B in October 2019, second dose of hepatitis B November 2019, final dose of both vaccines ending this year  -Received  PPSV23 on 5/30/2019.  Recommend PCV 13 when available     Weight loss  -Now improving  -Continue Ensure  -Chest x-ray from October 2019 with no acute concerns    CKD II-III  -Monitor, renally dose medication      Please obtain repeat CD4 count and viral load, CBC and CMP since last labs are from December 2019.  Please send these to us.    Return to ID clinic in 3 months with repeat CD4 count and viral load, CBC and CMP    Dav Echols M.D.    Please note that this dictation was created using voice recognition software. I have worked with technical experts from [employer] to optimize the interface.  I have made every reasonable attempt to correct obvious errors, but there may be errors of grammar and possibly content that I did not discover before finalizing the note.

## 2020-06-18 ENCOUNTER — TELEMEDICINE2 (OUTPATIENT)
Dept: VASCULAR LAB | Facility: MEDICAL CENTER | Age: 45
End: 2020-06-18
Attending: INTERNAL MEDICINE
Payer: OTHER GOVERNMENT

## 2020-06-18 DIAGNOSIS — B20 AIDS (ACQUIRED IMMUNODEFICIENCY SYNDROME), CD4 <=200/<=14% (HCC): ICD-10-CM

## 2020-06-18 DIAGNOSIS — N18.2 CKD (CHRONIC KIDNEY DISEASE) STAGE 2, GFR 60-89 ML/MIN: ICD-10-CM

## 2020-06-18 DIAGNOSIS — B20 SYMPTOMATIC HIV INFECTION (HCC): ICD-10-CM

## 2020-06-18 DIAGNOSIS — R63.4 WEIGHT LOSS: ICD-10-CM

## 2020-06-18 DIAGNOSIS — F33.42 RECURRENT MAJOR DEPRESSIVE DISORDER, IN FULL REMISSION (HCC): ICD-10-CM

## 2020-06-18 PROCEDURE — 99213 OFFICE O/P EST LOW 20 MIN: CPT | Performed by: INTERNAL MEDICINE

## 2020-06-18 RX ORDER — BICTEGRAVIR SODIUM, EMTRICITABINE, AND TENOFOVIR ALAFENAMIDE FUMARATE 50; 200; 25 MG/1; MG/1; MG/1
1 TABLET ORAL DAILY
Qty: 30 TAB | Refills: 11 | Status: SHIPPED | OUTPATIENT
Start: 2020-06-18 | End: 2021-04-14 | Stop reason: SDUPTHER

## 2020-06-18 RX ORDER — SULFAMETHOXAZOLE AND TRIMETHOPRIM 800; 160 MG/1; MG/1
1 TABLET ORAL DAILY
Qty: 30 TAB | Refills: 11 | Status: SHIPPED | OUTPATIENT
Start: 2020-06-18 | End: 2021-04-14 | Stop reason: SDUPTHER

## 2020-06-18 NOTE — PROGRESS NOTES
"[facility] INFECTIOUS DISEASES CLINIC FOLLOW-UP NOTE     Date of Service: 6/18/20     Chief Complaint: Follow-up for HIV    History of Present Illness:     Derrick Gaytan is a 44 y.o. male with known HIV and depression.  He also states he has a solitary kidney. The patient is on Biktarvy as well as Bactrim for pneumocystis prophylaxis and reports no missed doses, no side effects.  He is doing well with no concerning issues or symptoms today.   He has been concerned about weight loss and has been on Ensure and today has gained a few more pounds.      Current ART: Biktarvy-change from Descovy and Tivicay in 10/2019 for single pill regimen  Prior HIV treatment: Multiple, including Truvada, indinavir, didanosine, Descovy + Tivicay  Resistance: Unknown  Diagnosed with HIV: January 2000  Risk factors: MSM, \"Living [place] the [address]\"  HIV CD4 / viral load at start of treatment: Unknown  OIs: \"Double pneumonia\"     Pertinent Lab Results:  Date                 CD4/%             HIV VL             ART  6/2018                                     [id number]          Descovy+Tivicay  8/2018             135/10.4%       11566              \"  12/2018           143/7.5%         80                    \"  1/28/2019        148/8.2%         140                  \"  6/15/2019        239/10.4%       50                    \"  10/12/2019      218/10.9%       40                    \"  12/14/2019 275/12.5% 50  Biktarvy  3/20/20 252/12% <20  \"    Screening:   HBV serologies: Nonimmune  HAV serology: Nonimmune  HCV Ab:negative 7/2019. Negative 3/14/20   RPR: negative 7/2019  PPD: -3/2019  Urine GC/CT: -9/2019  UA: No proteinuria 9/2019     LABS  Date                 WBC                HGB                PLAT  1/28/2019        3.3                   15.9                 177  6/15/2019        4.4                   15.4                 197        10/12/2019      3.5                   16.4                 " 191  12/13/2019 3.9  16.2  179  3/20/20 4.2  16.7  182     Date                 CR         1/28/2019        1.38  6/15/2019        1.48, EGFR 56  10/12/2019      1.36, EGFR 62  12/13/2019 1.37, EGFR 61  3/20/20 1.33  EGFR 64     Date                 PT/INR  TBili   AlkPh               AST     ALT      Album  1/28/2019                      0.3                             22        13  6/15/2019                    0.4                               29        32  10/12/2019                  0.4       86                    25        19  12/13/2019  0.4 90  28 22 4.9  3/20/20  0.4 96  35 40 4.8                 Lipids   Date                 Chol     Trig      HDL     VLDL   LDL  7/13/2019        155      191      28        30        89  12/13/2019 158 179 35 36 87  3/30/20 196 212 39 42 115     HgbA1C  Date                 HbA1c  7/13/2019        5.5%     Review of Systems:  All other systems reviewed and are negative expect as noted in HPI    Past Medical History:   Diagnosis Date   • AIDS (acquired immune deficiency syndrome) (HCC)    • Depression    • HIV (human immunodeficiency virus infection)    • kidney problems     have only one kidney    • Single kidney        No past surgical history on file.    Family History   Problem Relation Age of Onset   • Diabetes Mother    • Diabetes Father    • No Known Problems Sister        Social History     Socioeconomic History   • Marital status: Single     Spouse name: Not on file   • Number of children: Not on file   • Years of education: Not on file   • Highest education level: Not on file   Occupational History   • Not on file   Social Needs   • Financial resource strain: Not on file   • Food insecurity     Worry: Not on file     Inability: Not on file   • Transportation needs     Medical: Not on file     Non-medical: Not on file   Tobacco Use   • Smoking status: Former Smoker   • Smokeless tobacco: Never Used   • Tobacco comment: 1 ppd   Substance and Sexual Activity   •  "Alcohol use: No   • Drug use: Yes     Types: Methamphetamines     Comment: pot   • Sexual activity: Not Currently     Partners: Male   Lifestyle   • Physical activity     Days per week: Not on file     Minutes per session: Not on file   • Stress: Not on file   Relationships   • Social connections     Talks on phone: Not on file     Gets together: Not on file     Attends Spiritism service: Not on file     Active member of club or organization: Not on file     Attends meetings of clubs or organizations: Not on file     Relationship status: Not on file   • Intimate partner violence     Fear of current or ex partner: Not on file     Emotionally abused: Not on file     Physically abused: Not on file     Forced sexual activity: Not on file   Other Topics Concern   • Not on file   Social History Narrative   • Not on file       Allergies   Allergen Reactions   • Didanosine      \"Nearly killed me\". Breathing difficulties       Medications:  Current Outpatient Medications on File Prior to Visit   Medication Sig Dispense Refill   • Bictegravir-Emtricitab-Tenofov (BIKTARVY) -25 MG Tab Take 1 Tab by mouth every day. 30 Tab 11   • sulfamethoxazole-trimethoprim (BACTRIM DS) 800-160 MG tablet Take 1 Tab by mouth every day. 30 Tab 11   • traZODone (DESYREL) 100 MG Tab Take 100 mg by mouth every evening.     • sertraline (ZOLOFT) 50 MG Tab Take 50 mg by mouth every day.       No current facility-administered medications on file prior to visit.        Physical Exam:   Vital Signs: T 97.7, pulse 80, RR 18, 96%, /69, 138.2 pounds  Vital signs reviewed    Audiology equipment was not working so relied on exam from provider onsite as well as visual and interview with patient.     Constitutional: Patient is oriented to person, place, and time. Appears well-developed and well-nourished. No distress  Head: Atraumatic, normocephalic  Eyes: Conjunctivae normal, EOM intact.   Mouth/Throat: Lips without lesions, oropharynx is clear " and moist.  Neck: Neck supple. No masses/lymphadenopathy  Cardiovascular: Normal rate, regular rhythm, normal S1S2 and intact distal pulses. No murmur, gallop, or friction rub. No pedal edema.  Pulmonary/Chest: No respiratory distress. Unlabored respiratory effort, lungs clear to auscultation. No wheezes or rales.   Abdominal: Soft, non tender. BS + x 4. No masses or hepatosplenomegaly.   Musculoskeletal: No joint tenderness, swelling, erythema, or restriction of motion noted.  Neurological: Alert and oriented to person, place, and time. No gross cranial nerve deficit.   Skin: Skin is warm and dry. No rashes or embolic phenomena noted on exposed skin  Psychiatric: Pleasant. Normal mood and affect. Behavior is normal.       Latest pertinent labs were reviewed    Assessment:   Derrick Gaytan is a 43 y.o. male with a history of HIV, reported solitary kidney and depression.  He is chronically low CD4 count and has been continued on Bactrim for pneumocystis prophylaxis.  He is currently on Biktarvy with undetectable viral load.     Pertinent Diagnoses:  HIV, CD4% <14%  Weight loss, improving  CKD II-III, solitary kidney per patient   Depression       HIV  --- Continue current ART (Biktarvy) refills provided   --- Continue Bactrim 1 double strength daily for pneumocystis prophylaxis, will continue to monitor CD4 count and percentage and if stable to improving may be able to discontinue Bactrim-refills provided  --- Labs prior to next visit: ([facility] numbers are provided):   o HIV Viral Load (820011)  o CD4-Lymphocyte Assay ([id number])   o Complete Blood Count with differential and platelets  o Comprehensive Metabolic Profile  o Urinalysis   o Hemoglobin A1C   o Syphilis screen - any syphilis screen is ok, but if Treponema IgG is positive we also need reflexive RPR quant OR can just get RPR quant ([id number]) - prior to 1st visit and once a year  o Chlamydia/Gonococcus NAAT ([id number]) urine - prior to 1st visit and once a year    o PPD or Quantiferon Gold - or documentation -  prior to 1st visit and once a year   o Documentation of CXR if positive PPD    --- RTC in 3 months     Vaccines  -Received first dose of hepatitis A and B in October 2019, second dose of hepatitis B November 2019, final dose of both vaccines ending this year  -Received PPSV23 on 5/30/2019.  Recommend PCV 13 when available     Weight loss  -Improving, he weighs 138.2 pounds today     --- Continue Ensure  --- Chest x-ray from October 2019 with no acute concerns    CKD II-III  -Monitor, renally dose medication        Antoinette Qiu M.D.    Please note that this dictation was created using voice recognition software. I have worked with technical experts from [facility]  [employer] to optimize the interface.  I have made every reasonable attempt to correct obvious errors, but there may be errors of grammar and possibly content that I did not discover before finalizing the note.

## 2020-08-12 PROCEDURE — RXMED WILLOW AMBULATORY MEDICATION CHARGE: Performed by: INTERNAL MEDICINE

## 2020-08-13 ENCOUNTER — PHARMACY VISIT (OUTPATIENT)
Dept: PHARMACY | Facility: MEDICAL CENTER | Age: 45
End: 2020-08-13
Payer: OTHER GOVERNMENT

## 2020-09-10 ENCOUNTER — PHARMACY VISIT (OUTPATIENT)
Dept: PHARMACY | Facility: MEDICAL CENTER | Age: 45
End: 2020-09-10
Payer: OTHER GOVERNMENT

## 2020-09-10 PROCEDURE — RXMED WILLOW AMBULATORY MEDICATION CHARGE: Performed by: INTERNAL MEDICINE

## 2020-10-08 ENCOUNTER — PHARMACY VISIT (OUTPATIENT)
Dept: PHARMACY | Facility: MEDICAL CENTER | Age: 45
End: 2020-10-08
Payer: OTHER GOVERNMENT

## 2020-10-08 PROCEDURE — RXMED WILLOW AMBULATORY MEDICATION CHARGE: Performed by: INTERNAL MEDICINE

## 2020-10-08 RX ORDER — BICTEGRAVIR SODIUM, EMTRICITABINE, AND TENOFOVIR ALAFENAMIDE FUMARATE 50; 200; 25 MG/1; MG/1; MG/1
TABLET ORAL
Qty: 30 TAB | Refills: 11 | Status: SHIPPED | OUTPATIENT
Start: 2020-10-08 | End: 2021-10-07

## 2020-11-17 ENCOUNTER — PHARMACY VISIT (OUTPATIENT)
Dept: PHARMACY | Facility: MEDICAL CENTER | Age: 45
End: 2020-11-17
Payer: OTHER GOVERNMENT

## 2020-11-17 PROCEDURE — RXMED WILLOW AMBULATORY MEDICATION CHARGE: Performed by: INTERNAL MEDICINE

## 2020-12-11 ENCOUNTER — PHARMACY VISIT (OUTPATIENT)
Dept: PHARMACY | Facility: MEDICAL CENTER | Age: 45
End: 2020-12-11
Payer: OTHER GOVERNMENT

## 2020-12-11 PROCEDURE — RXMED WILLOW AMBULATORY MEDICATION CHARGE: Performed by: INTERNAL MEDICINE

## 2021-01-06 PROCEDURE — RXMED WILLOW AMBULATORY MEDICATION CHARGE: Performed by: INTERNAL MEDICINE

## 2021-01-07 ENCOUNTER — PHARMACY VISIT (OUTPATIENT)
Dept: PHARMACY | Facility: MEDICAL CENTER | Age: 46
End: 2021-01-07
Payer: OTHER GOVERNMENT

## 2021-02-08 PROCEDURE — RXMED WILLOW AMBULATORY MEDICATION CHARGE: Performed by: INTERNAL MEDICINE

## 2021-02-23 ENCOUNTER — PHARMACY VISIT (OUTPATIENT)
Dept: PHARMACY | Facility: MEDICAL CENTER | Age: 46
End: 2021-02-23
Payer: OTHER GOVERNMENT

## 2021-03-08 ENCOUNTER — PHARMACY VISIT (OUTPATIENT)
Dept: PHARMACY | Facility: MEDICAL CENTER | Age: 46
End: 2021-03-08
Payer: OTHER GOVERNMENT

## 2021-03-08 PROCEDURE — RXMED WILLOW AMBULATORY MEDICATION CHARGE: Performed by: INTERNAL MEDICINE

## 2021-04-09 PROCEDURE — RXMED WILLOW AMBULATORY MEDICATION CHARGE: Performed by: INTERNAL MEDICINE

## 2021-04-12 ENCOUNTER — PHARMACY VISIT (OUTPATIENT)
Dept: PHARMACY | Facility: MEDICAL CENTER | Age: 46
End: 2021-04-12
Payer: OTHER GOVERNMENT

## 2021-04-14 ENCOUNTER — TELEMEDICINE2 (OUTPATIENT)
Dept: VASCULAR LAB | Facility: MEDICAL CENTER | Age: 46
End: 2021-04-14
Attending: INTERNAL MEDICINE
Payer: OTHER GOVERNMENT

## 2021-04-14 DIAGNOSIS — B20 AIDS (ACQUIRED IMMUNODEFICIENCY SYNDROME), CD4 <=200/<=14% (HCC): ICD-10-CM

## 2021-04-14 PROCEDURE — 99214 OFFICE O/P EST MOD 30 MIN: CPT | Performed by: INTERNAL MEDICINE

## 2021-04-14 RX ORDER — SULFAMETHOXAZOLE AND TRIMETHOPRIM 800; 160 MG/1; MG/1
1 TABLET ORAL DAILY
Qty: 30 TABLET | Refills: 11 | Status: SHIPPED | OUTPATIENT
Start: 2021-04-14 | End: 2024-02-09

## 2021-04-14 RX ORDER — BICTEGRAVIR SODIUM, EMTRICITABINE, AND TENOFOVIR ALAFENAMIDE FUMARATE 50; 200; 25 MG/1; MG/1; MG/1
1 TABLET ORAL DAILY
Qty: 30 TABLET | Refills: 11 | Status: SHIPPED | OUTPATIENT
Start: 2021-04-14

## 2021-04-14 NOTE — PROGRESS NOTES
"[facility] INFECTIOUS DISEASES CLINIC FOLLOW-UP NOTE      Date of Service: 4/14/21      Chief Complaint: Follow-up for HIV     History of Present Illness:      Derrick Gaytan is a 44 y.o. male with known HIV and depression.  He also states he has a solitary kidney. The patient is on Biktarvy as well as Bactrim for pneumocystis prophylaxis.  He states that he has missed no doses of the Biktarvy but has missed approximately 1.5 weeks of Bactrim.  No ongoing concerning symptoms or side effects with medications.  He does report that approximately 1 week ago and 2 days prior to his labs he had food poisoning and was very ill with nausea vomiting and diarrhea for several days.  The symptoms are now resolved.       Current ART: Biktarvy-change from Descovy and Tivicay in 10/2019 for single pill regimen  Prior HIV treatment: Multiple, including Truvada, indinavir, didanosine, Descovy + Tivicay  Resistance: Unknown  Diagnosed with HIV: January 2000  Risk factors: MSM, \"Living on the street\"  HIV CD4 / viral load at start of treatment: Unknown  OIs: \"Double pneumonia\"     Pertinent Lab Results:  Date                 CD4/%             HIV VL             ART  6/2018                                     [id number]          Descovy+Tivicay  8/2018             135/10.4%       [id number]              \"  12/2018           143/7.5%         80                    \"  1/28/2019        148/8.2%         140                  \"  6/15/2019        239/10.4%       50                    \"  10/12/2019      218/10.9%       40                    \"  12/14/2019      275/12.5%       50                    Biktarvy  3/20/20            252/12%          <20                  \"  4/8/2021         302/12.6 %       50           \"     Screening:   HBV serologies: Nonimmune  HAV serology: Nonimmune  HCV Ab:negative 7/2019. Negative 3/14/20   RPR: negative 7/2019  PPD: -3/2019  Urine GC/CT: -9/2019, negative/negative 4/21  UA: No proteinuria 9/2019, no protein " 4/2021     LABS  Date                 WBC                HGB                PLAT  1/28/2019        3.3                   15.9                 177  6/15/2019        4.4                   15.4                 197        10/12/2019      3.5                   16.4                 191  12/13/2019      3.9                   16.2                 179  3/20/20            4.2                   16.7                 182  4/8/21         6.2          16         227      Date                 CR         1/28/2019        1.38  6/15/2019        1.48, EGFR 56  10/12/2019      1.36, EGFR 62  12/13/2019      1.37, EGFR 61  3/20/20            1.33  EGFR 64  4/8/21          1.13 EGFR 77     Date                 PT/INR  TBili   AlkPh               AST     ALT      Album  1/28/2019                      0.3                             22        13  6/15/2019                    0.4                               29        32  10/12/2019                  0.4       86                    25        19  12/13/2019                  0.4       90                    28        22        4.9  3/20/20                        0.4       96                    35        40        4.8  4/8/21                          0.8       119                   55        52                 Lipids   Date                 Chol     Trig      HDL     VLDL   LDL  7/13/2019        155      191      28        30        89  12/13/2019      158      179      35        36        87  3/30/20            196      212      39        42        115     HgbA1C  Date                 HbA1c  7/13/2019        5.5%  4/8/21         5.4%     Review of Systems:  All other systems reviewed and are negative expect as noted in HPI     Past Medical History        Past Medical History:   Diagnosis Date   • AIDS (acquired immune deficiency syndrome) (HCC)     • Depression     • HIV (human immunodeficiency virus infection)     • kidney problems       have only one kidney    • Single kidney       "        Past Surgical History   No past surgical history on file.        Family History         Family History   Problem Relation Age of Onset   • Diabetes Mother     • Diabetes Father     • No Known Problems Sister              Social History               Socioeconomic History   • Marital status: Single       Spouse name: Not on file   • Number of children: Not on file   • Years of education: Not on file   • Highest education level: Not on file   Occupational History   • Not on file   Social Needs   • Financial resource strain: Not on file   • Food insecurity       Worry: Not on file       Inability: Not on file   • Transportation needs       Medical: Not on file       Non-medical: Not on file   Tobacco Use   • Smoking status: Former Smoker   • Smokeless tobacco: Never Used   • Tobacco comment: 1 ppd   Substance and Sexual Activity   • Alcohol use: No   • Drug use: Yes       Types: Methamphetamines       Comment: pot   • Sexual activity: Not Currently       Partners: Male   Lifestyle   • Physical activity       Days per week: Not on file       Minutes per session: Not on file   • Stress: Not on file   Relationships   • Social connections       Talks on phone: Not on file       Gets together: Not on file       Attends Restoration service: Not on file       Active member of club or organization: Not on file       Attends meetings of clubs or organizations: Not on file       Relationship status: Not on file   • Intimate partner violence       Fear of current or ex partner: Not on file       Emotionally abused: Not on file       Physically abused: Not on file       Forced sexual activity: Not on file   Other Topics Concern   • Not on file   Social History Narrative   • Not on file                  Allergies   Allergen Reactions   • Didanosine         \"Nearly killed me\". Breathing difficulties         Medications:         Current Outpatient Medications on File Prior to Visit   Medication Sig Dispense Refill   • " Bictegravir-Emtricitab-Tenofov (BIKTARVY) -25 MG Tab Take 1 Tab by mouth every day. 30 Tab 11   • sulfamethoxazole-trimethoprim (BACTRIM DS) 800-160 MG tablet Take 1 Tab by mouth every day. 30 Tab 11   • traZODone (DESYREL) 100 MG Tab Take 100 mg by mouth every evening.       • sertraline (ZOLOFT) 50 MG Tab Take 50 mg by mouth every day.          No current facility-administered medications on file prior to visit.          Physical Exam:   Vital Signs:  T 99.5, HR 78, /91, RR 16, 97%, 136 pounds  Vital signs reviewed     Audiology equipment was not connecting well so relied on exam from provider onsite as well as visual and interview with patient.      Constitutional: Patient is oriented to person, place, and time. Appears well-developed and well-nourished. No distress  Head: Atraumatic, normocephalic  Eyes: Conjunctivae normal, EOM intact.   Mouth/Throat: Lips without lesions, oropharynx is clear and moist.  Neck: Neck supple. No masses/lymphadenopathy  Cardiovascular: Normal rate, regular rhythm, normal S1S2 and intact distal pulses. No murmur, gallop, or friction rub. No pedal edema.  Pulmonary/Chest: No respiratory distress. Unlabored respiratory effort, lungs clear to auscultation. No wheezes or rales.   Abdominal: Soft, non tender. BS + x 4. No masses or hepatosplenomegaly.   Musculoskeletal: No joint tenderness, swelling, erythema, or restriction of motion noted.  Neurological: Alert and oriented to person, place, and time. No gross cranial nerve deficit.   Skin: Skin is warm and dry. No rashes or embolic phenomena noted on exposed skin  Psychiatric: Pleasant. Normal mood and affect. Behavior is normal.         Latest pertinent labs were reviewed     Assessment:   Derrick Gaytan is a 43 y.o. male with a history of HIV, reported solitary kidney and depression.  He is on Biktarvy with overall very good viral suppression.  He does have a slight increase in viral load on most recent labs to 50.  He  has chronically low CD4 count and percentage -although it has been slowly increasing - and has been continued on Bactrim for pneumocystis prophylaxis with approximate 1 week of missed doses.  He has a new mild elevation in AST and ALT.      Pertinent Diagnoses:  HIV, CD4% <14%  Weight loss, improving  CKD II-III, solitary kidney per patient   Transaminitis, new   Recent food poisoning per patient, with nausea vomiting and diarrhea, symptoms have resolved  Depression        HIV  --- Continue current ART (Biktarvy) refills provided   --- Continue Bactrim 1 double strength daily for pneumocystis prophylaxis, will continue to monitor CD4 count and percentage and if stable to improving may be able to discontinue Bactrim if CD4 remains >200 and percentage increases to > 14  - refills provided  --- Labs prior to next visit: ([facility] numbers are provided):   ? HIV Viral Load ([id number])  ? CD4-Lymphocyte Assay ([id number])   ? Complete Blood Count with differential and platelets  ? Comprehensive Metabolic Profile  ? Syphilis screen - any syphilis screen is ok, but if Treponema IgG is positive we also need reflexive RPR quant OR can just get RPR quant ([id number]) - prior to 1st visit and once a year  ? PPD or Quantiferon Gold - or documentation -  prior to 1st visit and once a year   ? Documentation of CXR if positive PPD     --- RTC in 3 months      Vaccines  -Received first dose of hepatitis A and B in October 2019, second dose of hepatitis B November 2019, final dose of both vaccines ending this year  -Received PPSV23 on 5/30/2019.  Recommend PCV 13 when available    --- Recommend COVID-19 vaccine     Weight loss  -Improved and stable     CKD II-III  -Monitor, renally dose medication, stable     Transaminitis    --- New and mild but will need to monitor  --- Rpeat CMP in 1 week and notify ID if ALT and AST remain elevated.  Biktarvy can rarely cause some elevations in AST/ALT but he has been stable on this for some time so appears  there is a secondary process.  See below.  --- Repeat acute hepatitis panel and notify ID if any abnormal results     Recent food poisoning / gastroenteritis  -This occurred 2 days before his labs were taken may have contributed to the slight increase in viral load as well as the transaminitis above.    Hypertension  -Patient is on atenolol 5 mg, recommend follow-up with onsite provider         Antoinette Qiu M.D.     Please note that this dictation was created using voice recognition software. I have worked with technical experts from [facility] to optimize the interface.  I have made every reasonable attempt to correct obvious errors, but there may be errors of grammar and possibly content that I did not discover before finalizing the note.

## 2024-02-07 ENCOUNTER — TELEPHONE (OUTPATIENT)
Dept: HEALTH INFORMATION MANAGEMENT | Facility: OTHER | Age: 49
End: 2024-02-07
Payer: MEDICAID

## 2024-02-09 ENCOUNTER — OFFICE VISIT (OUTPATIENT)
Dept: CARDIOLOGY | Facility: MEDICAL CENTER | Age: 49
End: 2024-02-09
Attending: INTERNAL MEDICINE
Payer: MEDICAID

## 2024-02-09 VITALS
DIASTOLIC BLOOD PRESSURE: 70 MMHG | HEIGHT: 65 IN | WEIGHT: 138 LBS | SYSTOLIC BLOOD PRESSURE: 120 MMHG | OXYGEN SATURATION: 95 % | RESPIRATION RATE: 16 BRPM | BODY MASS INDEX: 22.99 KG/M2 | HEART RATE: 101 BPM

## 2024-02-09 DIAGNOSIS — R94.31 NONSPECIFIC ABNORMAL ELECTROCARDIOGRAM (ECG) (EKG): ICD-10-CM

## 2024-02-09 DIAGNOSIS — R06.02 SOB (SHORTNESS OF BREATH): ICD-10-CM

## 2024-02-09 DIAGNOSIS — I10 ESSENTIAL HYPERTENSION, BENIGN: ICD-10-CM

## 2024-02-09 DIAGNOSIS — Z72.0 TOBACCO ABUSE: ICD-10-CM

## 2024-02-09 DIAGNOSIS — R00.2 PALPITATIONS: ICD-10-CM

## 2024-02-09 DIAGNOSIS — E78.5 DYSLIPIDEMIA: ICD-10-CM

## 2024-02-09 LAB — EKG IMPRESSION: NORMAL

## 2024-02-09 PROCEDURE — 3074F SYST BP LT 130 MM HG: CPT | Performed by: INTERNAL MEDICINE

## 2024-02-09 PROCEDURE — 99244 OFF/OP CNSLTJ NEW/EST MOD 40: CPT | Mod: 25 | Performed by: INTERNAL MEDICINE

## 2024-02-09 PROCEDURE — 93005 ELECTROCARDIOGRAM TRACING: CPT | Performed by: INTERNAL MEDICINE

## 2024-02-09 PROCEDURE — 99406 BEHAV CHNG SMOKING 3-10 MIN: CPT | Performed by: INTERNAL MEDICINE

## 2024-02-09 PROCEDURE — 3078F DIAST BP <80 MM HG: CPT | Performed by: INTERNAL MEDICINE

## 2024-02-09 PROCEDURE — 93010 ELECTROCARDIOGRAM REPORT: CPT | Performed by: INTERNAL MEDICINE

## 2024-02-09 PROCEDURE — 99211 OFF/OP EST MAY X REQ PHY/QHP: CPT | Mod: 25 | Performed by: INTERNAL MEDICINE

## 2024-02-09 ASSESSMENT — ENCOUNTER SYMPTOMS
VOMITING: 0
NAUSEA: 0
CHILLS: 0
PSYCHIATRIC NEGATIVE: 1
NERVOUS/ANXIOUS: 0
NEUROLOGICAL NEGATIVE: 1
EYES NEGATIVE: 1
DIZZINESS: 0
WEIGHT LOSS: 0
MYALGIAS: 0
BRUISES/BLEEDS EASILY: 0
HEADACHES: 0
ABDOMINAL PAIN: 0
WEAKNESS: 0
FEVER: 0
BLURRED VISION: 0
HEARTBURN: 1
FOCAL WEAKNESS: 0
PALPITATIONS: 0
CONSTITUTIONAL NEGATIVE: 1
SHORTNESS OF BREATH: 1
DIARRHEA: 1
CARDIOVASCULAR NEGATIVE: 1
CLAUDICATION: 0
COUGH: 0
DEPRESSION: 0
DOUBLE VISION: 0
NECK PAIN: 1

## 2024-02-09 NOTE — PROGRESS NOTES
Chief Complaint   Patient presents with    Chronic Kidney Disease     NP Dx:CKD (chronic kidney disease) stage 2, GFR 60-89 ml/min    Palpitations     NP       Subjective     Derrick Gaytan is a 48 y.o. male who presents today as a consult from Mireya Sanchez for palpitations.    Thank you for allowing me to evaluate Mr. Gaytan, who as you know is a 48 year old male with hypertension and hyperlipidemia, long chronic tobacco abuse, chronic nicotine vape abuse, no family history of coronary artery disease. He was well until 1 year ago when he began to experience palpitations. He describes his palpitations to be mild palpitation sensations, lasting couple of minutes. He admits to associated shortness of breath. He denies associated chest pain, diaphoresis, nausea and vomiting. His palpitations are noted after he sits down, aggravated by exertion and are alleviated by rest. He keeps active walking. He is under significant stress currently, recently being released from residential. He started working at the [employer].     Past Medical History:   Diagnosis Date    AIDS (acquired immune deficiency syndrome) (HCC)     Depression     HIV (human immunodeficiency virus infection)     Hyperlipidemia     Hypertension     kidney problems     have only one kidney     Single kidney      Past Surgical History:   Procedure Laterality Date    NO PERTINENT PAST SURGICAL HISTORY       Family History   Problem Relation Age of Onset    Diabetes Mother     Diabetes Father     No Known Problems Sister     Heart Disease Neg Hx     Heart Attack Neg Hx      Social History     Socioeconomic History    Marital status: Unknown     Spouse name: Not on file    Number of children: Not on file    Years of education: Not on file    Highest education level: Not on file   Occupational History    Not on file   Tobacco Use    Smoking status: Former    Smokeless tobacco: Never    Tobacco comments:     1 ppd   Vaping Use    Vaping Use: Every day     "Substances: Nicotine   Substance and Sexual Activity    Alcohol use: No    Drug use: Yes     Types: Methamphetamines     Comment: pot    Sexual activity: Not Currently     Partners: Male   Other Topics Concern    Not on file   Social History Narrative    Not on file     Social Determinants of Health     Financial Resource Strain: Not on file   Food Insecurity: Not on file   Transportation Needs: Not on file   Physical Activity: Not on file   Stress: Not on file   Social Connections: Not on file   Intimate Partner Violence: Not on file   Housing Stability: Not on file     Allergies   Allergen Reactions    Didanosine      \"Nearly killed me\". Breathing difficulties     (Medications reviewed.)  Outpatient Encounter Medications as of 2/9/2024   Medication Sig Dispense Refill    bictegravir-emtricitab-TAF (BIKTARVY) -25 mg Tab tablet Take 1 tablet by mouth every day. 30 tablet 11    [DISCONTINUED] sulfamethoxazole-trimethoprim (BACTRIM DS) 800-160 MG tablet Take 1 tablet by mouth every day. (Patient not taking: Reported on 2/9/2024) 30 tablet 11    [DISCONTINUED] traZODone (DESYREL) 100 MG Tab Take 100 mg by mouth every evening. (Patient not taking: Reported on 2/9/2024)      [DISCONTINUED] sertraline (ZOLOFT) 50 MG Tab Take 50 mg by mouth every day. (Patient not taking: Reported on 2/9/2024)       No facility-administered encounter medications on file as of 2/9/2024.     Review of Systems   Constitutional: Negative.  Negative for chills, fever, malaise/fatigue and weight loss.   HENT:  Positive for ear pain. Negative for hearing loss.    Eyes: Negative.  Negative for blurred vision and double vision.   Respiratory:  Positive for shortness of breath. Negative for cough.    Cardiovascular: Negative.  Negative for chest pain, palpitations, claudication and leg swelling.   Gastrointestinal:  Positive for diarrhea and heartburn. Negative for abdominal pain, nausea and vomiting.   Genitourinary: Negative.  Negative for " "dysuria and urgency.   Musculoskeletal:  Positive for neck pain. Negative for joint pain and myalgias.   Skin: Negative.  Negative for itching and rash.   Neurological: Negative.  Negative for dizziness, focal weakness, weakness and headaches.   Endo/Heme/Allergies:  Positive for environmental allergies. Does not bruise/bleed easily.   Psychiatric/Behavioral: Negative.  Negative for depression. The patient is not nervous/anxious.               Objective     /70 (BP Location: Left arm, Patient Position: Sitting, BP Cuff Size: Adult)   Pulse (!) 101   Resp 16   Ht 1.651 m (5' 5\")   Wt 62.6 kg (138 lb)   SpO2 95%   BMI 22.96 kg/m²     Physical Exam  Constitutional:       Appearance: Normal appearance. He is well-developed and normal weight.   HENT:      Head: Normocephalic and atraumatic.   Neck:      Vascular: No JVD.   Cardiovascular:      Rate and Rhythm: Normal rate and regular rhythm.      Heart sounds: Normal heart sounds.   Pulmonary:      Effort: Pulmonary effort is normal.      Breath sounds: Normal breath sounds.   Abdominal:      General: Bowel sounds are normal.      Palpations: Abdomen is soft.      Comments: No hepatosplenomegaly.   Musculoskeletal:         General: Normal range of motion.   Lymphadenopathy:      Cervical: No cervical adenopathy.   Skin:     General: Skin is warm and dry.   Neurological:      Mental Status: He is alert and oriented to person, place, and time.            CARDIAC STUDIES/PROCEDURES:    EKG was ordered for palpitations, performed on (02/09/24) was reviewed: EKG, personally interpreted shows sinus rhythm .     Assessment & Plan     1. Palpitations  EKG      2. SOB (shortness of breath)        3. Nonspecific abnormal electrocardiogram (ECG) (EKG)        4. Essential hypertension, benign        5. Dyslipidemia        6. Tobacco abuse            Medical Decision Making: Today's Assessment/Status/Plan:        Palpitations, shortness of breath: He is experiencing " symptoms as described above. We will perform a Zio patch monitor and echocardiogram and follow up with him.  Abnormal EKG: The EKG is abnormal as described above. We will perform a myocardial perfusion imaging study.   Hypertension: Blood pressure is well controlled. We will continue with amlodipine, lisinopril.  Hyperlipidemia: He is doing well on statin therapy without myalgia symptoms.  Chronic tobacco use: Smoking cessation recommended with discussions of health effects and plans for over 3 minutes.    We will follow up in 3 months.    Thank you for this consult.

## 2024-02-14 ENCOUNTER — NON-PROVIDER VISIT (OUTPATIENT)
Dept: CARDIOLOGY | Facility: MEDICAL CENTER | Age: 49
End: 2024-02-14
Attending: INTERNAL MEDICINE
Payer: MEDICAID

## 2024-02-14 DIAGNOSIS — R00.2 PALPITATIONS: ICD-10-CM

## 2024-02-14 NOTE — PROGRESS NOTES
Patient enrolled in the 14 day [facility] Holter monitoring program per Lamin Jeffers MD.  >Monitor mailed to patient by Mad Mimi.  >Currently pending EOS.

## 2024-03-12 ENCOUNTER — TELEPHONE (OUTPATIENT)
Dept: CARDIOLOGY | Facility: MEDICAL CENTER | Age: 49
End: 2024-03-12
Payer: MEDICAID

## 2024-03-19 ENCOUNTER — APPOINTMENT (OUTPATIENT)
Dept: RADIOLOGY | Facility: MEDICAL CENTER | Age: 49
End: 2024-03-19
Attending: INTERNAL MEDICINE
Payer: MEDICAID

## 2024-07-05 ENCOUNTER — PHARMACY VISIT (OUTPATIENT)
Dept: PHARMACY | Facility: MEDICAL CENTER | Age: 49
End: 2024-07-05
Payer: OTHER GOVERNMENT

## 2024-07-05 ENCOUNTER — HOSPITAL ENCOUNTER (EMERGENCY)
Facility: MEDICAL CENTER | Age: 49
End: 2024-07-05
Attending: EMERGENCY MEDICINE
Payer: COMMERCIAL

## 2024-07-05 VITALS
RESPIRATION RATE: 16 BRPM | WEIGHT: 114.2 LBS | SYSTOLIC BLOOD PRESSURE: 158 MMHG | BODY MASS INDEX: 19.03 KG/M2 | OXYGEN SATURATION: 98 % | HEART RATE: 84 BPM | HEIGHT: 65 IN | TEMPERATURE: 97.2 F | DIASTOLIC BLOOD PRESSURE: 98 MMHG

## 2024-07-05 DIAGNOSIS — Z20.2 STD EXPOSURE: ICD-10-CM

## 2024-07-05 DIAGNOSIS — R36.9 PENILE DISCHARGE: ICD-10-CM

## 2024-07-05 LAB
APPEARANCE UR: CLEAR
BILIRUB UR QL STRIP.AUTO: NEGATIVE
COLOR UR: YELLOW
GLUCOSE UR STRIP.AUTO-MCNC: NEGATIVE MG/DL
HIV 1+2 AB+HIV1 P24 AG SERPL QL IA: ABNORMAL
KETONES UR STRIP.AUTO-MCNC: NEGATIVE MG/DL
LEUKOCYTE ESTERASE UR QL STRIP.AUTO: NEGATIVE
MICRO URNS: NORMAL
NITRITE UR QL STRIP.AUTO: NEGATIVE
PH UR STRIP.AUTO: 6 [PH] (ref 5–8)
PROT UR QL STRIP: NEGATIVE MG/DL
RBC UR QL AUTO: NEGATIVE
SP GR UR STRIP.AUTO: 1.01
T PALLIDUM AB SER QL IA: NORMAL
UROBILINOGEN UR STRIP.AUTO-MCNC: 0.2 MG/DL

## 2024-07-05 PROCEDURE — 81003 URINALYSIS AUTO W/O SCOPE: CPT

## 2024-07-05 PROCEDURE — 87591 N.GONORRHOEAE DNA AMP PROB: CPT

## 2024-07-05 PROCEDURE — 96372 THER/PROPH/DIAG INJ SC/IM: CPT

## 2024-07-05 PROCEDURE — A9270 NON-COVERED ITEM OR SERVICE: HCPCS | Performed by: EMERGENCY MEDICINE

## 2024-07-05 PROCEDURE — RXMED WILLOW AMBULATORY MEDICATION CHARGE: Performed by: EMERGENCY MEDICINE

## 2024-07-05 PROCEDURE — 86701 HIV-1ANTIBODY: CPT

## 2024-07-05 PROCEDURE — 87389 HIV-1 AG W/HIV-1&-2 AB AG IA: CPT

## 2024-07-05 PROCEDURE — 99284 EMERGENCY DEPT VISIT MOD MDM: CPT

## 2024-07-05 PROCEDURE — 86780 TREPONEMA PALLIDUM: CPT

## 2024-07-05 PROCEDURE — 86702 HIV-2 ANTIBODY: CPT

## 2024-07-05 PROCEDURE — 700102 HCHG RX REV CODE 250 W/ 637 OVERRIDE(OP): Performed by: EMERGENCY MEDICINE

## 2024-07-05 PROCEDURE — 87491 CHLMYD TRACH DNA AMP PROBE: CPT

## 2024-07-05 PROCEDURE — 700111 HCHG RX REV CODE 636 W/ 250 OVERRIDE (IP): Mod: JZ | Performed by: EMERGENCY MEDICINE

## 2024-07-05 PROCEDURE — 700101 HCHG RX REV CODE 250: Performed by: EMERGENCY MEDICINE

## 2024-07-05 RX ORDER — CEFTRIAXONE 500 MG/1
500 INJECTION, POWDER, FOR SOLUTION INTRAMUSCULAR; INTRAVENOUS ONCE
Status: COMPLETED | OUTPATIENT
Start: 2024-07-05 | End: 2024-07-05

## 2024-07-05 RX ORDER — DOXYCYCLINE 100 MG/1
100 TABLET ORAL ONCE
Status: COMPLETED | OUTPATIENT
Start: 2024-07-05 | End: 2024-07-05

## 2024-07-05 RX ORDER — DOXYCYCLINE 100 MG/1
100 CAPSULE ORAL 2 TIMES DAILY
Qty: 14 CAPSULE | Refills: 0 | Status: ACTIVE | OUTPATIENT
Start: 2024-07-05 | End: 2024-07-12

## 2024-07-05 RX ADMIN — CEFTRIAXONE SODIUM 500 MG: 500 INJECTION, POWDER, FOR SOLUTION INTRAMUSCULAR; INTRAVENOUS at 12:45

## 2024-07-05 RX ADMIN — LIDOCAINE HYDROCHLORIDE 1 ML: 10 INJECTION, SOLUTION EPIDURAL; INFILTRATION; INTRACAUDAL at 12:49

## 2024-07-05 RX ADMIN — DOXYCYCLINE 100 MG: 100 TABLET, FILM COATED ORAL at 12:29

## 2024-07-06 LAB
C TRACH DNA SPEC QL NAA+PROBE: NEGATIVE
HIV 1 & 2 AB SER-IMP: ABNORMAL
HIV 1 & 2 AB SERPL IA.RAPID: ABNORMAL
HIV 2 AB SERPL QL IA: NEGATIVE
HIV1 AB SERPL QL IA: POSITIVE
N GONORRHOEA DNA SPEC QL NAA+PROBE: NEGATIVE
SPECIMEN SOURCE: NORMAL

## 2024-09-04 ENCOUNTER — HOSPITAL ENCOUNTER (EMERGENCY)
Facility: MEDICAL CENTER | Age: 49
End: 2024-09-04
Payer: COMMERCIAL

## 2024-09-04 VITALS
HEIGHT: 65 IN | RESPIRATION RATE: 16 BRPM | SYSTOLIC BLOOD PRESSURE: 157 MMHG | BODY MASS INDEX: 19.28 KG/M2 | TEMPERATURE: 97.5 F | WEIGHT: 115.74 LBS | DIASTOLIC BLOOD PRESSURE: 105 MMHG | OXYGEN SATURATION: 97 % | HEART RATE: 110 BPM

## 2024-09-04 PROCEDURE — 302449 STATCHG TRIAGE ONLY (STATISTIC)

## 2024-09-04 PROCEDURE — 99283 EMERGENCY DEPT VISIT LOW MDM: CPT

## 2024-09-05 ENCOUNTER — HOSPITAL ENCOUNTER (EMERGENCY)
Facility: MEDICAL CENTER | Age: 49
End: 2024-09-05
Attending: STUDENT IN AN ORGANIZED HEALTH CARE EDUCATION/TRAINING PROGRAM
Payer: COMMERCIAL

## 2024-09-05 VITALS
RESPIRATION RATE: 18 BRPM | DIASTOLIC BLOOD PRESSURE: 82 MMHG | TEMPERATURE: 98.3 F | HEIGHT: 65 IN | BODY MASS INDEX: 19.21 KG/M2 | SYSTOLIC BLOOD PRESSURE: 138 MMHG | OXYGEN SATURATION: 95 % | HEART RATE: 110 BPM | WEIGHT: 115.3 LBS

## 2024-09-05 DIAGNOSIS — Z20.7 CONTACT WITH AND (SUSPECTED) EXPOSURE TO PEDICULOSIS, ACARIASIS AND OTHER INFESTATIONS: ICD-10-CM

## 2024-09-05 LAB — SCCMEC + MECA PNL NOSE NAA+PROBE: NEGATIVE

## 2024-09-05 PROCEDURE — 700102 HCHG RX REV CODE 250 W/ 637 OVERRIDE(OP): Performed by: STUDENT IN AN ORGANIZED HEALTH CARE EDUCATION/TRAINING PROGRAM

## 2024-09-05 PROCEDURE — A9270 NON-COVERED ITEM OR SERVICE: HCPCS | Performed by: STUDENT IN AN ORGANIZED HEALTH CARE EDUCATION/TRAINING PROGRAM

## 2024-09-05 PROCEDURE — 87641 MR-STAPH DNA AMP PROBE: CPT

## 2024-09-05 RX ORDER — IBUPROFEN 200 MG
400 TABLET ORAL ONCE
Status: COMPLETED | OUTPATIENT
Start: 2024-09-05 | End: 2024-09-05

## 2024-09-05 RX ORDER — CETIRIZINE HYDROCHLORIDE 10 MG/1
10 TABLET ORAL DAILY
Status: DISCONTINUED | OUTPATIENT
Start: 2024-09-05 | End: 2024-09-05 | Stop reason: HOSPADM

## 2024-09-05 RX ORDER — BENZOCAINE/MENTHOL 6 MG-10 MG
1 LOZENGE MUCOUS MEMBRANE 2 TIMES DAILY PRN
Qty: 14 G | Refills: 0 | Status: SHIPPED | OUTPATIENT
Start: 2024-09-05 | End: 2024-09-12

## 2024-09-05 RX ORDER — CETIRIZINE HYDROCHLORIDE 10 MG/1
10 TABLET ORAL DAILY
Qty: 30 TABLET | Refills: 0 | Status: SHIPPED | OUTPATIENT
Start: 2024-09-05

## 2024-09-05 RX ADMIN — IBUPROFEN 400 MG: 200 TABLET, FILM COATED ORAL at 00:59

## 2024-09-05 RX ADMIN — CETIRIZINE HYDROCHLORIDE 10 MG: 10 TABLET, FILM COATED ORAL at 00:59

## 2024-09-05 NOTE — ED NOTES
Patient was changed into paper scrubs and instructed to place personal items in a double bag. Patient tolerated. No bed bugs visualized. Patient is AAOX4. Breathing is even and unlabored. Patient tolerated testing and medication administration.

## 2024-09-05 NOTE — ED NOTES
PAR at [facility] called to say pt is attempting to check in there. Pt dismissed from ED

## 2024-09-05 NOTE — ED NOTES
Derrick Gaytan Jr. was discharged home ambulatory, gait upright, steady. Patient is AAOX4. Breathing is even and unlabored. Patient tolerated MRSA swab. Patient ambulated self unassisted to [facility]. Patient was wearing paper scrubs with personal items in double bag.

## 2024-09-05 NOTE — ED PROVIDER NOTES
"ED Provider Note    CHIEF COMPLAINT  No chief complaint on file.      EXTERNAL RECORDS REVIEWED  Outpatient Notes infectious disease follow-up on 4/14/2021 for HIV and depression, patient is taking antiretrovirals and Bactrim for PJP prophylaxis.    HPI/ROS  LIMITATION TO HISTORY   Select: : None  OUTSIDE HISTORIAN(S):    Derrick Gaytan Jr. is a 49 y.o. male with past medical history of HIV on antiretrovirals, has not missed any doses, last CD4 count 2 months ago was 600 who presents with a rash.  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  Patient reports that yesterday he started having the rash.  Patient is concerned it might be MRSA.  Patient denies difficulty breathing or swallowing, fevers chills body aches or sweats.  Patient reports that the rash is itchy and painful.    PAST MEDICAL HISTORY   has a past medical history of AIDS (acquired immune deficiency syndrome) (HCC), Depression, HIV (human immunodeficiency virus infection), Hyperlipidemia, Hypertension, kidney problems, and Single kidney.    SURGICAL HISTORY   has a past surgical history that includes no pertinent past surgical history.    FAMILY HISTORY  Family History   Problem Relation Age of Onset    Diabetes Mother     Diabetes Father     No Known Problems Sister     Heart Disease Neg Hx     Heart Attack Neg Hx        SOCIAL HISTORY  Social History     Tobacco Use    Smoking status: Every Day     Types: Cigarettes    Smokeless tobacco: Never    Tobacco comments:     1 ppd   Vaping Use    Vaping status: Every Day    Substances: Nicotine   Substance and Sexual Activity    Alcohol use: No    Drug use: Yes     Types: Methamphetamines     Comment: pot    Sexual activity: Not Currently     Partners: Male       CURRENT MEDICATIONS  Home Medications    **Home medications have not yet been reviewed for this encounter**         ALLERGIES  Allergies   Allergen Reactions    Didanosine      \"Nearly killed me\". Breathing difficulties " "      PHYSICAL EXAM  VITAL SIGNS: BP (!) 145/91   Pulse (!) 113   Temp 36.8 °C (98.3 °F) (Temporal)   Resp 18   Ht 1.651 m (5' 5\")   Wt 52.3 kg (115 lb 4.8 oz)   SpO2 94%   BMI 19.19 kg/m²    Vitals and nursing note reviewed.   Constitutional:       Comments: Patient is lying in bed supine, pleasant, conversant, speaking in complete sentences   HENT:      Head: Normocephalic and atraumatic.   Eyes:      Extraocular Movements: Extraocular movements intact.      Conjunctiva/sclera: Conjunctivae normal.      Pupils: Pupils are equal, round, and reactive to light.   Cardiovascular:      Comments:   Pulmonary:      Effort: Pulmonary effort is normal. No respiratory distress.   Musculoskeletal:         General: No swelling. Normal range of motion.      Cervical back: Normal range of motion. No rigidity.   Skin:  Diffuse erythematous papules, with central areas of erythema     General: Skin is warm and dry.      Capillary Refill: Capillary refill takes less than 2 seconds.   Neurological:      Mental Status: Alert.         COURSE & MEDICAL DECISION MAKING    ASSESSMENT, COURSE AND PLAN  Care Narrative: No difficulty breathing, no hypotension, no CNS or GI symptoms, anaphylaxis inconsistent with patient presentation at this time.  Patient is not immunocompromise, afebrile, AIDS defining illness is inconsistent with patient presentation at this time.  Rash is consistent with bedbug, patient counseled on washing his clothes, hot water washing of his bedding.  Patient discharged with hydrocortisone cream for itchy wounds and Zyrtec.    Repeat physical exam benign.  I doubt any serious emergency process at this time.  Patient and/or family, friends given strict return precautions for worsening symptoms and care instructions. They have demonstrated understanding of discharge instructions through teach back mechanism. Advised PCP follow-up in 1-2 days.  Patient/family/friend expresses understanding and agrees to " plan.    This dictation has been created using voice recognition software. I am continuously working with the software to minimize the number of voice recognition errors and I have made every attempt to manually correct the errors within my dictation. However errors  related to this voice recognition software may still exist and should be interpreted within the appropriate context.     Electronically signed by: Jamar Jo M.D., 9/5/2024 12:57 AM    DISPOSITION AND DISCUSSIONS    Escalation of care considered, and ultimately not performed:Laboratory analysis    Decision tools and prescription drugs considered including, but not limited to: Antibiotics not indicated in the absence of bacterial infection .    FINAL DIAGNOSIS  1. Contact with and (suspected) exposure to pediculosis, acariasis and other infestations         Electronically signed by: Jamar Jo M.D., 9/5/2024 12:53 AM

## 2024-09-05 NOTE — ED TRIAGE NOTES
"Chief Complaint   Patient presents with    Wound Check     Pt reports he noticed full body rash last night before he went to bed, denies pain or fevers. Pt endorses pruritus. Pt reports hx of MRSA.        50 yo M to triage for above complaint.      Pt placed in lobby. Pt educated on triage process. Pt encouraged to alert staff for any changes.     Patient and staff wearing appropriate PPE    BP (!) 157/105   Pulse (!) 110   Temp 36.4 °C (97.5 °F) (Temporal)   Resp 16   Ht 1.651 m (5' 5\")   Wt 52.5 kg (115 lb 11.9 oz)   SpO2 97%   BMI 19.26 kg/m²     "

## 2024-09-05 NOTE — ED TRIAGE NOTES
Patient arrived to ED with complaint of rash and itching. 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 Patient Is AAOX4. Breathing is even and unlabored. Patient has no other questions or concerns at this time

## 2025-06-05 ENCOUNTER — HOSPITAL ENCOUNTER (EMERGENCY)
Facility: MEDICAL CENTER | Age: 50
End: 2025-06-05
Attending: EMERGENCY MEDICINE

## 2025-06-05 ENCOUNTER — PHARMACY VISIT (OUTPATIENT)
Dept: PHARMACY | Facility: MEDICAL CENTER | Age: 50
End: 2025-06-05
Payer: COMMERCIAL

## 2025-06-05 VITALS
WEIGHT: 115.08 LBS | RESPIRATION RATE: 16 BRPM | SYSTOLIC BLOOD PRESSURE: 128 MMHG | OXYGEN SATURATION: 95 % | HEART RATE: 115 BPM | BODY MASS INDEX: 19.15 KG/M2 | TEMPERATURE: 96.9 F | DIASTOLIC BLOOD PRESSURE: 87 MMHG

## 2025-06-05 DIAGNOSIS — L02.91 ABSCESS: Primary | ICD-10-CM

## 2025-06-05 PROCEDURE — RXMED WILLOW AMBULATORY MEDICATION CHARGE: Performed by: EMERGENCY MEDICINE

## 2025-06-05 PROCEDURE — 99281 EMR DPT VST MAYX REQ PHY/QHP: CPT

## 2025-06-05 RX ORDER — SULFAMETHOXAZOLE AND TRIMETHOPRIM 800; 160 MG/1; MG/1
1 TABLET ORAL 2 TIMES DAILY
Qty: 10 TABLET | Refills: 0 | Status: ACTIVE | OUTPATIENT
Start: 2025-06-05 | End: 2025-06-10

## 2025-06-05 RX ORDER — AMOXICILLIN 500 MG/1
500 CAPSULE ORAL 3 TIMES DAILY
Qty: 15 CAPSULE | Refills: 0 | Status: ACTIVE | OUTPATIENT
Start: 2025-06-05 | End: 2025-06-10

## 2025-06-06 NOTE — DISCHARGE INSTRUCTIONS
We did advise drainage of the wound so if you change your mind please feel free to return at anytime for this.  Ensure that you take your antibiotics, consider warm compresses hot soaks to the area as well.  Seek medical attention for any fevers, worsening pain, worsening swelling or other concerns

## 2025-06-06 NOTE — ED NOTES
Patient discharge per order. Oral and written discharge instructions reviewed. Medications sent to [facility] pharmacy. New Medications reviewed. All belongings accounted for and taken with patient. Questions answered, and patient agrees with discharge plan. Encouraged to follow up with PCP.

## 2025-06-06 NOTE — ED TRIAGE NOTES
"Chief Complaint   Patient presents with    Wound Check     Pt reports wound to left sacral area. Reports that he is concerned that it is MRSA \"because I've had it before.\" Pt became very irritable with RN asking questions in triage. CIDET.  Pt reports wound has been present x 3 days.   /87   Pulse (!) 115   Temp 36.1 °C (96.9 °F) (Temporal)   Resp 16   Wt 52.2 kg (115 lb 1.3 oz)   SpO2 95%   Pt informed of wait times. Educated on triage process.  Asked to return to triage RN for any new or worsening of symptoms. Thanked for patience.      "

## 2025-06-06 NOTE — ED PROVIDER NOTES
ED Provider Note    ED PHYSICIAN NOTE    CHIEF COMPLAINT  Chief Complaint   Patient presents with    Wound Check       EXTERNAL RECORDS REVIEWED  Outpatient Notes cardiology note from February 2024 with noted palpitations, noted history of chronic kidney disease additional emergency department visit in September with history of HIV    HPI/ROS  LIMITATION TO HISTORY   Select: : None  OUTSIDE HISTORIAN(S):  none    Derrick Gaytan Jr. is a 50 y.o. male who presents with a wound near his buttock.  He reports it has been there around 3 days, some drainage, it is painful.  He reports no fevers or chills, no other rash or lesion.  No abdominal pain or other symptoms    PAST MEDICAL HISTORY  Past Medical History[1]    SOCIAL HISTORY  Social History[2]    CURRENT MEDICATIONS  Home Medications    **Home medications have not yet been reviewed for this encounter**         ALLERGIES  Allergies[3]    PHYSICAL EXAM  VITAL SIGNS: /87   Pulse (!) 115   Temp 36.1 °C (96.9 °F) (Temporal)   Resp 16   Wt 52.2 kg (115 lb 1.3 oz)   SpO2 95%   BMI 19.15 kg/m²    Constitutional: Awake and alert   HENT: Normal inspection, no signs of trauma  Eyes: Normal inspection, Pupils equal, non-icteric  Neck: Grossly normal range of motion. No stridor  Cardiovascular: Regular rate and rhythm, no murmurs.    Thorax & Lungs: No respiratory distress, No wheezing, No rales, No rhonchi, No chest tenderness.   Abdomen:  soft, non-distended, nontender, no mass  Skin: No obvious rash. Warm. Dry.  Just superior to the gluteal cleft there is 2 cm of erythema with draining wound.  Some induration, minimal fluctuance.  Neurologic: AO3, Grossly normal,   Psychiatric: Normal affect for situation        DIAGNOSTIC STUDIES / PROCEDURES          COURSE & MEDICAL DECISION MAKING    INITIAL ASSESSMENT, COURSE AND PLAN  Care Narrative: 6:35 PM  Patient presents with wound, appears to be draining abscess at this time.  I discussed with patient that we will  need to open this up and drain it further.  He is adamant he does not want this done.  It is already draining somewhat but I do think it would heal more quickly and potentially prevent more significant infection if we were able to drain it and I expressed this to them.  He is still quite adamant that he does not want to have this drained.  He has no fevers, does have some slight tachycardia but is overall well-appearing.  Given this he will be started on antibiotics, discussed tricked return precautions, encouraged to return at any time for drainage as well       PROBLEM LIST    # Abscess.  Currently draining, patient declined I&D here, see discussion above, will start on Bactrim amoxicillin per protocol      DISPOSITION AND DISCUSSIONS    Barriers to care at this time, including but not limited to: none    Prescription drugs considered and/or prescribed:     Prescribed   New Prescriptions    AMOXICILLIN (AMOXIL) 500 MG CAP    Take 1 Capsule by mouth 3 times a day for 5 days.    SULFAMETHOXAZOLE-TRIMETHOPRIM (BACTRIM DS) 800-160 MG TABLET    Take 1 Tablet by mouth 2 times a day for 5 days.     The patient will return for new or worsening symptoms and is stable at the time of discharge.    The patient is referred to a primary physician for blood pressure management, diabetic screening, and for all other preventative health concerns.      DISPOSITION:  Patient will be discharged home in stable condition.    FOLLOW UP:  Mireya Salguero A.P.R.N.  [address] [facility] [address] [phone]    In 1 week  As needed      OUTPATIENT MEDICATIONS:  New Prescriptions    AMOXICILLIN (AMOXIL) 500 MG CAP    Take 1 Capsule by mouth 3 times a day for 5 days.    SULFAMETHOXAZOLE-TRIMETHOPRIM (BACTRIM DS) 800-160 MG TABLET    Take 1 Tablet by mouth 2 times a day for 5 days.         FINAL DIAGNOSIS  1. Abscess  amoxicillin (AMOXIL) 500 MG Cap    sulfamethoxazole-trimethoprim (BACTRIM DS) 800-160 MG tablet             This dictation  "was created using voice recognition software. The accuracy of the dictation is limited to the abilities of the software. I expect there may be some errors of grammar and possibly content. The nursing notes were reviewed and certain aspects of this information were incorporated into this note.    Electronically signed by: Raheel Stanford M.D., 6/5/2025          [1]   Past Medical History:  Diagnosis Date    AIDS (acquired immune deficiency syndrome) (HCC)     Depression     HIV (human immunodeficiency virus infection)     Hyperlipidemia     Hypertension     kidney problems     have only one kidney     Single kidney    [2]   Social History  Tobacco Use    Smoking status: Every Day     Types: Cigarettes    Smokeless tobacco: Never    Tobacco comments:     1 ppd   Vaping Use    Vaping status: Some Days    Substances: Nicotine   Substance Use Topics    Alcohol use: No    Drug use: Not Currently     Types: Methamphetamines     Comment: pot   [3]   Allergies  Allergen Reactions    Didanosine      \"Nearly killed me\". Breathing difficulties     "